# Patient Record
Sex: MALE | Race: WHITE | NOT HISPANIC OR LATINO | URBAN - METROPOLITAN AREA
[De-identification: names, ages, dates, MRNs, and addresses within clinical notes are randomized per-mention and may not be internally consistent; named-entity substitution may affect disease eponyms.]

---

## 2024-03-08 ENCOUNTER — PHARMACY VISIT (OUTPATIENT)
Dept: PHARMACY | Facility: CLINIC | Age: 34
End: 2024-03-08

## 2024-03-08 PROCEDURE — RXOTC WILLOW AMBULATORY OTC CHARGE

## 2024-09-22 ENCOUNTER — APPOINTMENT (OUTPATIENT)
Dept: RADIOLOGY | Facility: HOSPITAL | Age: 34
DRG: 465 | End: 2024-09-22
Payer: MEDICARE

## 2024-09-22 ENCOUNTER — HOSPITAL ENCOUNTER (INPATIENT)
Facility: HOSPITAL | Age: 34
DRG: 465 | End: 2024-09-22
Attending: STUDENT IN AN ORGANIZED HEALTH CARE EDUCATION/TRAINING PROGRAM | Admitting: SURGERY
Payer: MEDICARE

## 2024-09-22 VITALS
RESPIRATION RATE: 16 BRPM | SYSTOLIC BLOOD PRESSURE: 129 MMHG | HEART RATE: 79 BPM | WEIGHT: 220 LBS | DIASTOLIC BLOOD PRESSURE: 67 MMHG | TEMPERATURE: 98.4 F | HEIGHT: 72 IN | BODY MASS INDEX: 29.8 KG/M2 | OXYGEN SATURATION: 98 %

## 2024-09-22 DIAGNOSIS — S99.911A INJURY OF RIGHT ANKLE, INITIAL ENCOUNTER: ICD-10-CM
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
## 2024-09-22 LAB
ABO GROUP (TYPE) IN BLOOD: NORMAL
ALBUMIN SERPL BCP-MCNC: 4.7 G/DL (ref 3.4–5)
ALP SERPL-CCNC: 61 U/L (ref 33–120)
ALT SERPL W P-5'-P-CCNC: 39 U/L (ref 10–52)
ANION GAP SERPL CALC-SCNC: 14 MMOL/L (ref 10–20)
ANTIBODY SCREEN: NORMAL
AST SERPL W P-5'-P-CCNC: 57 U/L (ref 9–39)
BASOPHILS # BLD AUTO: 0.05 X10*3/UL (ref 0–0.1)
BASOPHILS NFR BLD AUTO: 0.6 %
BILIRUB SERPL-MCNC: 0.5 MG/DL (ref 0–1.2)
BUN SERPL-MCNC: 12 MG/DL (ref 6–23)
CALCIUM SERPL-MCNC: 9.3 MG/DL (ref 8.6–10.3)
CHLORIDE SERPL-SCNC: 104 MMOL/L (ref 98–107)
CO2 SERPL-SCNC: 25 MMOL/L (ref 21–32)
CREAT SERPL-MCNC: 1.08 MG/DL (ref 0.5–1.3)
EGFRCR SERPLBLD CKD-EPI 2021: >90 ML/MIN/1.73M*2
EOSINOPHIL # BLD AUTO: 0.09 X10*3/UL (ref 0–0.7)
EOSINOPHIL NFR BLD AUTO: 1.1 %
ERYTHROCYTE [DISTWIDTH] IN BLOOD BY AUTOMATED COUNT: 12.2 % (ref 11.5–14.5)
ERYTHROCYTE [DISTWIDTH] IN BLOOD BY AUTOMATED COUNT: 12.6 % (ref 11.5–14.5)
ETHANOL SERPL-MCNC: <10 MG/DL
GLUCOSE SERPL-MCNC: 107 MG/DL (ref 74–99)
HCT VFR BLD AUTO: 42.7 % (ref 41–52)
HCT VFR BLD AUTO: 43.5 % (ref 41–52)
HGB BLD-MCNC: 14.9 G/DL (ref 13.5–17.5)
HGB BLD-MCNC: 15.3 G/DL (ref 13.5–17.5)
HOLD SPECIMEN: NORMAL
IMM GRANULOCYTES # BLD AUTO: 0.02 X10*3/UL (ref 0–0.7)
IMM GRANULOCYTES NFR BLD AUTO: 0.2 % (ref 0–0.9)
INR PPP: 1.1 (ref 0.9–1.1)
LACTATE SERPL-SCNC: 1.1 MMOL/L (ref 0.4–2)
LACTATE SERPL-SCNC: 2.9 MMOL/L (ref 0.4–2)
LYMPHOCYTES # BLD AUTO: 2.86 X10*3/UL (ref 1.2–4.8)
LYMPHOCYTES NFR BLD AUTO: 33.5 %
MCH RBC QN AUTO: 30.4 PG (ref 26–34)
MCH RBC QN AUTO: 30.5 PG (ref 26–34)
MCHC RBC AUTO-ENTMCNC: 34.9 G/DL (ref 32–36)
MCHC RBC AUTO-ENTMCNC: 35.2 G/DL (ref 32–36)
MCV RBC AUTO: 86 FL (ref 80–100)
MCV RBC AUTO: 88 FL (ref 80–100)
MONOCYTES # BLD AUTO: 0.84 X10*3/UL (ref 0.1–1)
MONOCYTES NFR BLD AUTO: 9.8 %
NEUTROPHILS # BLD AUTO: 4.68 X10*3/UL (ref 1.2–7.7)
NEUTROPHILS NFR BLD AUTO: 54.8 %
NRBC BLD-RTO: 0 /100 WBCS (ref 0–0)
NRBC BLD-RTO: 0 /100 WBCS (ref 0–0)
PLATELET # BLD AUTO: 230 X10*3/UL (ref 150–450)
PLATELET # BLD AUTO: 264 X10*3/UL (ref 150–450)
POTASSIUM SERPL-SCNC: 3.4 MMOL/L (ref 3.5–5.3)
PROT SERPL-MCNC: 7.4 G/DL (ref 6.4–8.2)
PROTHROMBIN TIME: 12.4 SECONDS (ref 9.8–12.8)
RBC # BLD AUTO: 4.88 X10*6/UL (ref 4.5–5.9)
RBC # BLD AUTO: 5.04 X10*6/UL (ref 4.5–5.9)
RH FACTOR (ANTIGEN D): NORMAL
SODIUM SERPL-SCNC: 140 MMOL/L (ref 136–145)
WBC # BLD AUTO: 8.5 X10*3/UL (ref 4.4–11.3)
WBC # BLD AUTO: 8.9 X10*3/UL (ref 4.4–11.3)

## 2024-09-22 PROCEDURE — 99291 CRITICAL CARE FIRST HOUR: CPT | Mod: 25 | Performed by: STUDENT IN AN ORGANIZED HEALTH CARE EDUCATION/TRAINING PROGRAM

## 2024-09-22 PROCEDURE — 82077 ASSAY SPEC XCP UR&BREATH IA: CPT | Performed by: STUDENT IN AN ORGANIZED HEALTH CARE EDUCATION/TRAINING PROGRAM

## 2024-09-22 PROCEDURE — 86901 BLOOD TYPING SEROLOGIC RH(D): CPT | Performed by: STUDENT IN AN ORGANIZED HEALTH CARE EDUCATION/TRAINING PROGRAM

## 2024-09-22 PROCEDURE — 70450 CT HEAD/BRAIN W/O DYE: CPT

## 2024-09-22 PROCEDURE — 2500000004 HC RX 250 GENERAL PHARMACY W/ HCPCS (ALT 636 FOR OP/ED): Mod: JZ

## 2024-09-22 PROCEDURE — 72125 CT NECK SPINE W/O DYE: CPT

## 2024-09-22 PROCEDURE — 80053 COMPREHEN METABOLIC PANEL: CPT | Performed by: STUDENT IN AN ORGANIZED HEALTH CARE EDUCATION/TRAINING PROGRAM

## 2024-09-22 PROCEDURE — 72128 CT CHEST SPINE W/O DYE: CPT | Mod: RCN

## 2024-09-22 PROCEDURE — 73700 CT LOWER EXTREMITY W/O DYE: CPT | Mod: RT

## 2024-09-22 PROCEDURE — G0390 TRAUMA RESPONS W/HOSP CRITI: HCPCS

## 2024-09-22 PROCEDURE — 2500000004 HC RX 250 GENERAL PHARMACY W/ HCPCS (ALT 636 FOR OP/ED): Performed by: SURGERY

## 2024-09-22 PROCEDURE — 36415 COLL VENOUS BLD VENIPUNCTURE: CPT | Performed by: STUDENT IN AN ORGANIZED HEALTH CARE EDUCATION/TRAINING PROGRAM

## 2024-09-22 PROCEDURE — 72125 CT NECK SPINE W/O DYE: CPT | Performed by: RADIOLOGY

## 2024-09-22 PROCEDURE — 72131 CT LUMBAR SPINE W/O DYE: CPT | Mod: RCN

## 2024-09-22 PROCEDURE — 73650 X-RAY EXAM OF HEEL: CPT | Mod: RIGHT SIDE | Performed by: RADIOLOGY

## 2024-09-22 PROCEDURE — 71045 X-RAY EXAM CHEST 1 VIEW: CPT | Performed by: RADIOLOGY

## 2024-09-22 PROCEDURE — 73700 CT LOWER EXTREMITY W/O DYE: CPT | Mod: RIGHT SIDE | Performed by: RADIOLOGY

## 2024-09-22 PROCEDURE — 71045 X-RAY EXAM CHEST 1 VIEW: CPT

## 2024-09-22 PROCEDURE — 83605 ASSAY OF LACTIC ACID: CPT | Performed by: STUDENT IN AN ORGANIZED HEALTH CARE EDUCATION/TRAINING PROGRAM

## 2024-09-22 PROCEDURE — 72128 CT CHEST SPINE W/O DYE: CPT | Performed by: RADIOLOGY

## 2024-09-22 PROCEDURE — 74177 CT ABD & PELVIS W/CONTRAST: CPT | Performed by: RADIOLOGY

## 2024-09-22 PROCEDURE — 72131 CT LUMBAR SPINE W/O DYE: CPT | Performed by: RADIOLOGY

## 2024-09-22 PROCEDURE — 73620 X-RAY EXAM OF FOOT: CPT | Mod: RT

## 2024-09-22 PROCEDURE — 2500000001 HC RX 250 WO HCPCS SELF ADMINISTERED DRUGS (ALT 637 FOR MEDICARE OP): Performed by: ORTHOPAEDIC SURGERY

## 2024-09-22 PROCEDURE — 96376 TX/PRO/DX INJ SAME DRUG ADON: CPT

## 2024-09-22 PROCEDURE — 2500000004 HC RX 250 GENERAL PHARMACY W/ HCPCS (ALT 636 FOR OP/ED): Performed by: STUDENT IN AN ORGANIZED HEALTH CARE EDUCATION/TRAINING PROGRAM

## 2024-09-22 PROCEDURE — 99221 1ST HOSP IP/OBS SF/LOW 40: CPT | Performed by: ORTHOPAEDIC SURGERY

## 2024-09-22 PROCEDURE — 70450 CT HEAD/BRAIN W/O DYE: CPT | Performed by: RADIOLOGY

## 2024-09-22 PROCEDURE — 73590 X-RAY EXAM OF LOWER LEG: CPT | Mod: RT

## 2024-09-22 PROCEDURE — 90471 IMMUNIZATION ADMIN: CPT | Performed by: STUDENT IN AN ORGANIZED HEALTH CARE EDUCATION/TRAINING PROGRAM

## 2024-09-22 PROCEDURE — 73590 X-RAY EXAM OF LOWER LEG: CPT | Mod: RIGHT SIDE | Performed by: RADIOLOGY

## 2024-09-22 PROCEDURE — 2500000004 HC RX 250 GENERAL PHARMACY W/ HCPCS (ALT 636 FOR OP/ED): Performed by: EMERGENCY MEDICINE

## 2024-09-22 PROCEDURE — 85025 COMPLETE CBC W/AUTO DIFF WBC: CPT | Performed by: STUDENT IN AN ORGANIZED HEALTH CARE EDUCATION/TRAINING PROGRAM

## 2024-09-22 PROCEDURE — 2550000001 HC RX 255 CONTRASTS: Performed by: STUDENT IN AN ORGANIZED HEALTH CARE EDUCATION/TRAINING PROGRAM

## 2024-09-22 PROCEDURE — 99232 SBSQ HOSP IP/OBS MODERATE 35: CPT | Performed by: SURGERY

## 2024-09-22 PROCEDURE — 2500000001 HC RX 250 WO HCPCS SELF ADMINISTERED DRUGS (ALT 637 FOR MEDICARE OP): Performed by: SURGERY

## 2024-09-22 PROCEDURE — 74177 CT ABD & PELVIS W/CONTRAST: CPT

## 2024-09-22 PROCEDURE — 96375 TX/PRO/DX INJ NEW DRUG ADDON: CPT

## 2024-09-22 PROCEDURE — 85610 PROTHROMBIN TIME: CPT | Performed by: STUDENT IN AN ORGANIZED HEALTH CARE EDUCATION/TRAINING PROGRAM

## 2024-09-22 PROCEDURE — 73630 X-RAY EXAM OF FOOT: CPT | Mod: RIGHT SIDE | Performed by: RADIOLOGY

## 2024-09-22 PROCEDURE — 90715 TDAP VACCINE 7 YRS/> IM: CPT | Performed by: STUDENT IN AN ORGANIZED HEALTH CARE EDUCATION/TRAINING PROGRAM

## 2024-09-22 PROCEDURE — 85027 COMPLETE CBC AUTOMATED: CPT | Performed by: SURGERY

## 2024-09-22 PROCEDURE — 71260 CT THORAX DX C+: CPT | Performed by: RADIOLOGY

## 2024-09-22 PROCEDURE — 73650 X-RAY EXAM OF HEEL: CPT | Mod: RT

## 2024-09-22 PROCEDURE — 1100000001 HC PRIVATE ROOM DAILY

## 2024-09-22 PROCEDURE — 96365 THER/PROPH/DIAG IV INF INIT: CPT

## 2024-09-22 RX ORDER — NALOXONE HYDROCHLORIDE 1 MG/ML
0.2 INJECTION INTRAMUSCULAR; INTRAVENOUS; SUBCUTANEOUS EVERY 5 MIN PRN
Status: DISCONTINUED | OUTPATIENT
Start: 2024-09-22 | End: 2024-09-22 | Stop reason: SDUPTHER

## 2024-09-22 RX ORDER — ACETAMINOPHEN 325 MG/1
975 TABLET ORAL EVERY 6 HOURS SCHEDULED
Status: DISCONTINUED | OUTPATIENT
Start: 2024-09-22 | End: 2024-09-24 | Stop reason: HOSPADM

## 2024-09-22 RX ORDER — OXYCODONE HYDROCHLORIDE 5 MG/1
10 TABLET ORAL EVERY 4 HOURS PRN
Status: DISCONTINUED | OUTPATIENT
Start: 2024-09-22 | End: 2024-09-24 | Stop reason: HOSPADM

## 2024-09-22 RX ORDER — PROCHLORPERAZINE MALEATE 5 MG
10 TABLET ORAL EVERY 6 HOURS PRN
Status: DISCONTINUED | OUTPATIENT
Start: 2024-09-22 | End: 2024-09-24 | Stop reason: HOSPADM

## 2024-09-22 RX ORDER — NALOXONE HYDROCHLORIDE 1 MG/ML
0.2 INJECTION INTRAMUSCULAR; INTRAVENOUS; SUBCUTANEOUS EVERY 5 MIN PRN
Status: DISCONTINUED | OUTPATIENT
Start: 2024-09-22 | End: 2024-09-24 | Stop reason: HOSPADM

## 2024-09-22 RX ORDER — AMOXICILLIN 250 MG
2 CAPSULE ORAL 2 TIMES DAILY
Status: DISCONTINUED | OUTPATIENT
Start: 2024-09-22 | End: 2024-09-24 | Stop reason: HOSPADM

## 2024-09-22 RX ORDER — OXYCODONE HYDROCHLORIDE 5 MG/1
5 TABLET ORAL EVERY 6 HOURS PRN
Status: DISCONTINUED | OUTPATIENT
Start: 2024-09-22 | End: 2024-09-24 | Stop reason: HOSPADM

## 2024-09-22 RX ORDER — CEFAZOLIN SODIUM 2 G/100ML
2 INJECTION, SOLUTION INTRAVENOUS ONCE
Status: COMPLETED | OUTPATIENT
Start: 2024-09-22 | End: 2024-09-22

## 2024-09-22 RX ORDER — ONDANSETRON 4 MG/1
4 TABLET, ORALLY DISINTEGRATING ORAL EVERY 8 HOURS PRN
Status: DISCONTINUED | OUTPATIENT
Start: 2024-09-22 | End: 2024-09-24 | Stop reason: HOSPADM

## 2024-09-22 RX ORDER — CEFAZOLIN SODIUM 2 G/100ML
INJECTION, SOLUTION INTRAVENOUS
Status: COMPLETED
Start: 2024-09-22 | End: 2024-09-22

## 2024-09-22 RX ORDER — PROCHLORPERAZINE 25 MG/1
25 SUPPOSITORY RECTAL EVERY 12 HOURS PRN
Status: DISCONTINUED | OUTPATIENT
Start: 2024-09-22 | End: 2024-09-24 | Stop reason: HOSPADM

## 2024-09-22 RX ORDER — ONDANSETRON HYDROCHLORIDE 2 MG/ML
4 INJECTION, SOLUTION INTRAVENOUS EVERY 8 HOURS PRN
Status: DISCONTINUED | OUTPATIENT
Start: 2024-09-22 | End: 2024-09-24 | Stop reason: HOSPADM

## 2024-09-22 RX ORDER — SODIUM CHLORIDE, SODIUM LACTATE, POTASSIUM CHLORIDE, CALCIUM CHLORIDE 600; 310; 30; 20 MG/100ML; MG/100ML; MG/100ML; MG/100ML
75 INJECTION, SOLUTION INTRAVENOUS CONTINUOUS
Status: DISCONTINUED | OUTPATIENT
Start: 2024-09-23 | End: 2024-09-24

## 2024-09-22 RX ORDER — ENOXAPARIN SODIUM 100 MG/ML
30 INJECTION SUBCUTANEOUS EVERY 12 HOURS
Status: DISCONTINUED | OUTPATIENT
Start: 2024-09-22 | End: 2024-09-24 | Stop reason: HOSPADM

## 2024-09-22 RX ORDER — PROCHLORPERAZINE EDISYLATE 5 MG/ML
10 INJECTION INTRAMUSCULAR; INTRAVENOUS EVERY 6 HOURS PRN
Status: DISCONTINUED | OUTPATIENT
Start: 2024-09-22 | End: 2024-09-24 | Stop reason: HOSPADM

## 2024-09-22 RX ORDER — OXYCODONE AND ACETAMINOPHEN 5; 325 MG/1; MG/1
1 TABLET ORAL EVERY 6 HOURS PRN
Status: DISCONTINUED | OUTPATIENT
Start: 2024-09-22 | End: 2024-09-22

## 2024-09-22 RX ADMIN — HYDROMORPHONE HYDROCHLORIDE 0.5 MG: 1 INJECTION, SOLUTION INTRAMUSCULAR; INTRAVENOUS; SUBCUTANEOUS at 03:33

## 2024-09-22 RX ADMIN — HYDROMORPHONE HYDROCHLORIDE 0.5 MG: 1 INJECTION, SOLUTION INTRAMUSCULAR; INTRAVENOUS; SUBCUTANEOUS at 06:57

## 2024-09-22 RX ADMIN — CEFAZOLIN SODIUM 2 G: 2 INJECTION, SOLUTION INTRAVENOUS at 00:44

## 2024-09-22 RX ADMIN — CEFAZOLIN SODIUM 2 G: 2 INJECTION, SOLUTION INTRAVENOUS at 21:24

## 2024-09-22 RX ADMIN — OXYCODONE HYDROCHLORIDE 10 MG: 5 TABLET ORAL at 17:20

## 2024-09-22 RX ADMIN — OXYCODONE HYDROCHLORIDE 10 MG: 5 TABLET ORAL at 21:24

## 2024-09-22 RX ADMIN — OXYCODONE HYDROCHLORIDE AND ACETAMINOPHEN 1 TABLET: 5; 325 TABLET ORAL at 14:49

## 2024-09-22 RX ADMIN — HYDROMORPHONE HYDROCHLORIDE 0.5 MG: 1 INJECTION, SOLUTION INTRAMUSCULAR; INTRAVENOUS; SUBCUTANEOUS at 11:41

## 2024-09-22 RX ADMIN — IOHEXOL 100 ML: 350 INJECTION, SOLUTION INTRAVENOUS at 01:03

## 2024-09-22 RX ADMIN — HYDROMORPHONE HYDROCHLORIDE 0.5 MG: 1 INJECTION, SOLUTION INTRAMUSCULAR; INTRAVENOUS; SUBCUTANEOUS at 00:47

## 2024-09-22 RX ADMIN — ONDANSETRON 4 MG: 2 INJECTION INTRAMUSCULAR; INTRAVENOUS at 21:24

## 2024-09-22 RX ADMIN — TETANUS TOXOID, REDUCED DIPHTHERIA TOXOID AND ACELLULAR PERTUSSIS VACCINE, ADSORBED 0.5 ML: 5; 2.5; 8; 8; 2.5 SUSPENSION INTRAMUSCULAR at 00:47

## 2024-09-22 RX ADMIN — ACETAMINOPHEN 975 MG: 325 TABLET ORAL at 17:17

## 2024-09-22 RX ADMIN — ENOXAPARIN SODIUM 30 MG: 30 INJECTION SUBCUTANEOUS at 17:24

## 2024-09-22 SDOH — SOCIAL STABILITY: SOCIAL INSECURITY
WITHIN THE LAST YEAR, HAVE YOU BEEN KICKED, HIT, SLAPPED, OR OTHERWISE PHYSICALLY HURT BY YOUR PARTNER OR EX-PARTNER?: NO

## 2024-09-22 SDOH — ECONOMIC STABILITY: HOUSING INSECURITY: AT ANY TIME IN THE PAST 12 MONTHS, WERE YOU HOMELESS OR LIVING IN A SHELTER (INCLUDING NOW)?: NO

## 2024-09-22 SDOH — SOCIAL STABILITY: SOCIAL NETWORK: ARE YOU MARRIED, WIDOWED, DIVORCED, SEPARATED, NEVER MARRIED, OR LIVING WITH A PARTNER?: MARRIED

## 2024-09-22 SDOH — SOCIAL STABILITY: SOCIAL NETWORK: HOW OFTEN DO YOU ATTENT MEETINGS OF THE CLUB OR ORGANIZATION YOU BELONG TO?: PATIENT DECLINED

## 2024-09-22 SDOH — ECONOMIC STABILITY: INCOME INSECURITY: IN THE LAST 12 MONTHS, WAS THERE A TIME WHEN YOU WERE NOT ABLE TO PAY THE MORTGAGE OR RENT ON TIME?: NO

## 2024-09-22 SDOH — ECONOMIC STABILITY: FOOD INSECURITY: HOW HARD IS IT FOR YOU TO PAY FOR THE VERY BASICS LIKE FOOD, HOUSING, MEDICAL CARE, AND HEATING?: NOT HARD AT ALL

## 2024-09-22 SDOH — SOCIAL STABILITY: SOCIAL INSECURITY: DOES ANYONE TRY TO KEEP YOU FROM HAVING/CONTACTING OTHER FRIENDS OR DOING THINGS OUTSIDE YOUR HOME?: NO

## 2024-09-22 SDOH — ECONOMIC STABILITY: INCOME INSECURITY: IN THE PAST 12 MONTHS, HAS THE ELECTRIC, GAS, OIL, OR WATER COMPANY THREATENED TO SHUT OFF SERVICE IN YOUR HOME?: NO

## 2024-09-22 SDOH — HEALTH STABILITY: PHYSICAL HEALTH
HOW OFTEN DO YOU NEED TO HAVE SOMEONE HELP YOU WHEN YOU READ INSTRUCTIONS, PAMPHLETS, OR OTHER WRITTEN MATERIAL FROM YOUR DOCTOR OR PHARMACY?: NEVER

## 2024-09-22 SDOH — SOCIAL STABILITY: SOCIAL INSECURITY: HAVE YOU HAD THOUGHTS OF HARMING ANYONE ELSE?: YES

## 2024-09-22 SDOH — HEALTH STABILITY: MENTAL HEALTH: HOW OFTEN DO YOU HAVE 6 OR MORE DRINKS ON ONE OCCASION?: NEVER

## 2024-09-22 SDOH — ECONOMIC STABILITY: TRANSPORTATION INSECURITY
IN THE PAST 12 MONTHS, HAS THE LACK OF TRANSPORTATION KEPT YOU FROM MEDICAL APPOINTMENTS OR FROM GETTING MEDICATIONS?: NO

## 2024-09-22 SDOH — SOCIAL STABILITY: SOCIAL INSECURITY: WITHIN THE LAST YEAR, HAVE YOU BEEN AFRAID OF YOUR PARTNER OR EX-PARTNER?: NO

## 2024-09-22 SDOH — ECONOMIC STABILITY: HOUSING INSECURITY: IN THE LAST 12 MONTHS, WAS THERE A TIME WHEN YOU WERE NOT ABLE TO PAY THE MORTGAGE OR RENT ON TIME?: NO

## 2024-09-22 SDOH — SOCIAL STABILITY: SOCIAL INSECURITY: DO YOU FEEL UNSAFE GOING BACK TO THE PLACE WHERE YOU ARE LIVING?: NO

## 2024-09-22 SDOH — SOCIAL STABILITY: SOCIAL NETWORK
IN A TYPICAL WEEK, HOW MANY TIMES DO YOU TALK ON THE PHONE WITH FAMILY, FRIENDS, OR NEIGHBORS?: MORE THAN THREE TIMES A WEEK

## 2024-09-22 SDOH — SOCIAL STABILITY: SOCIAL INSECURITY: ARE YOU MARRIED, WIDOWED, DIVORCED, SEPARATED, NEVER MARRIED, OR LIVING WITH A PARTNER?: MARRIED

## 2024-09-22 SDOH — HEALTH STABILITY: PHYSICAL HEALTH: ON AVERAGE, HOW MANY MINUTES DO YOU ENGAGE IN EXERCISE AT THIS LEVEL?: 60 MIN

## 2024-09-22 SDOH — HEALTH STABILITY: MENTAL HEALTH: HOW OFTEN DO YOU HAVE A DRINK CONTAINING ALCOHOL?: NEVER

## 2024-09-22 SDOH — HEALTH STABILITY: MENTAL HEALTH: HOW OFTEN DO YOU HAVE SIX OR MORE DRINKS ON ONE OCCASION?: NEVER

## 2024-09-22 SDOH — ECONOMIC STABILITY: HOUSING INSECURITY: IN THE PAST 12 MONTHS, HOW MANY TIMES HAVE YOU MOVED WHERE YOU WERE LIVING?: 1

## 2024-09-22 SDOH — ECONOMIC STABILITY: FOOD INSECURITY: WITHIN THE PAST 12 MONTHS, THE FOOD YOU BOUGHT JUST DIDN'T LAST AND YOU DIDN'T HAVE MONEY TO GET MORE.: NEVER TRUE

## 2024-09-22 SDOH — SOCIAL STABILITY: SOCIAL NETWORK: HOW OFTEN DO YOU ATTEND MEETINGS OF THE CLUBS OR ORGANIZATIONS YOU BELONG TO?: PATIENT DECLINED

## 2024-09-22 SDOH — SOCIAL STABILITY: SOCIAL NETWORK: HOW OFTEN DO YOU GET TOGETHER WITH FRIENDS OR RELATIVES?: MORE THAN THREE TIMES A WEEK

## 2024-09-22 SDOH — SOCIAL STABILITY: SOCIAL INSECURITY: WITHIN THE LAST YEAR, HAVE YOU BEEN HUMILIATED OR EMOTIONALLY ABUSED IN OTHER WAYS BY YOUR PARTNER OR EX-PARTNER?: NO

## 2024-09-22 SDOH — HEALTH STABILITY: MENTAL HEALTH
DO YOU FEEL STRESS - TENSE, RESTLESS, NERVOUS, OR ANXIOUS, OR UNABLE TO SLEEP AT NIGHT BECAUSE YOUR MIND IS TROUBLED ALL THE TIME - THESE DAYS?: NOT AT ALL

## 2024-09-22 SDOH — SOCIAL STABILITY: SOCIAL INSECURITY
WITHIN THE LAST YEAR, HAVE TO BEEN RAPED OR FORCED TO HAVE ANY KIND OF SEXUAL ACTIVITY BY YOUR PARTNER OR EX-PARTNER?: NO

## 2024-09-22 SDOH — ECONOMIC STABILITY: INCOME INSECURITY: HOW HARD IS IT FOR YOU TO PAY FOR THE VERY BASICS LIKE FOOD, HOUSING, MEDICAL CARE, AND HEATING?: NOT HARD AT ALL

## 2024-09-22 SDOH — SOCIAL STABILITY: SOCIAL INSECURITY: DO YOU FEEL ANYONE HAS EXPLOITED OR TAKEN ADVANTAGE OF YOU FINANCIALLY OR OF YOUR PERSONAL PROPERTY?: NO

## 2024-09-22 SDOH — SOCIAL STABILITY: SOCIAL INSECURITY: HAVE YOU HAD ANY THOUGHTS OF HARMING ANYONE ELSE?: NO

## 2024-09-22 SDOH — ECONOMIC STABILITY: FOOD INSECURITY: WITHIN THE PAST 12 MONTHS, YOU WORRIED THAT YOUR FOOD WOULD RUN OUT BEFORE YOU GOT THE MONEY TO BUY MORE.: NEVER TRUE

## 2024-09-22 SDOH — HEALTH STABILITY: MENTAL HEALTH
STRESS IS WHEN SOMEONE FEELS TENSE, NERVOUS, ANXIOUS, OR CAN'T SLEEP AT NIGHT BECAUSE THEIR MIND IS TROUBLED. HOW STRESSED ARE YOU?: NOT AT ALL

## 2024-09-22 SDOH — SOCIAL STABILITY: SOCIAL NETWORK
DO YOU BELONG TO ANY CLUBS OR ORGANIZATIONS SUCH AS CHURCH GROUPS UNIONS, FRATERNAL OR ATHLETIC GROUPS, OR SCHOOL GROUPS?: PATIENT DECLINED

## 2024-09-22 SDOH — HEALTH STABILITY: PHYSICAL HEALTH: ON AVERAGE, HOW MANY DAYS PER WEEK DO YOU ENGAGE IN MODERATE TO STRENUOUS EXERCISE (LIKE A BRISK WALK)?: 5 DAYS

## 2024-09-22 SDOH — SOCIAL STABILITY: SOCIAL INSECURITY: ARE THERE ANY APPARENT SIGNS OF INJURIES/BEHAVIORS THAT COULD BE RELATED TO ABUSE/NEGLECT?: NO

## 2024-09-22 SDOH — ECONOMIC STABILITY: FOOD INSECURITY: WITHIN THE PAST 12 MONTHS, YOU WORRIED THAT YOUR FOOD WOULD RUN OUT BEFORE YOU GOT MONEY TO BUY MORE.: NEVER TRUE

## 2024-09-22 SDOH — SOCIAL STABILITY: SOCIAL INSECURITY: HAS ANYONE EVER THREATENED TO HURT YOUR FAMILY OR YOUR PETS?: NO

## 2024-09-22 SDOH — SOCIAL STABILITY: SOCIAL INSECURITY
WITHIN THE LAST YEAR, HAVE YOU BEEN RAPED OR FORCED TO HAVE ANY KIND OF SEXUAL ACTIVITY BY YOUR PARTNER OR EX-PARTNER?: NO

## 2024-09-22 SDOH — SOCIAL STABILITY: SOCIAL NETWORK: HOW OFTEN DO YOU ATTEND CHURCH OR RELIGIOUS SERVICES?: PATIENT DECLINED

## 2024-09-22 SDOH — ECONOMIC STABILITY: INCOME INSECURITY: IN THE PAST 12 MONTHS HAS THE ELECTRIC, GAS, OIL, OR WATER COMPANY THREATENED TO SHUT OFF SERVICES IN YOUR HOME?: NO

## 2024-09-22 SDOH — HEALTH STABILITY: MENTAL HEALTH: HOW MANY STANDARD DRINKS CONTAINING ALCOHOL DO YOU HAVE ON A TYPICAL DAY?: PATIENT DOES NOT DRINK

## 2024-09-22 SDOH — ECONOMIC STABILITY: TRANSPORTATION INSECURITY
IN THE PAST 12 MONTHS, HAS LACK OF TRANSPORTATION KEPT YOU FROM MEETINGS, WORK, OR FROM GETTING THINGS NEEDED FOR DAILY LIVING?: NO

## 2024-09-22 SDOH — ECONOMIC STABILITY: TRANSPORTATION INSECURITY: IN THE PAST 12 MONTHS, HAS LACK OF TRANSPORTATION KEPT YOU FROM MEDICAL APPOINTMENTS OR FROM GETTING MEDICATIONS?: NO

## 2024-09-22 SDOH — SOCIAL STABILITY: SOCIAL NETWORK
DO YOU BELONG TO ANY CLUBS OR ORGANIZATIONS SUCH AS CHURCH GROUPS, UNIONS, FRATERNAL OR ATHLETIC GROUPS, OR SCHOOL GROUPS?: PATIENT DECLINED

## 2024-09-22 SDOH — SOCIAL STABILITY: SOCIAL INSECURITY: ABUSE: ADULT

## 2024-09-22 SDOH — SOCIAL STABILITY: SOCIAL INSECURITY: ARE YOU OR HAVE YOU BEEN THREATENED OR ABUSED PHYSICALLY, EMOTIONALLY, OR SEXUALLY BY ANYONE?: NO

## 2024-09-22 SDOH — SOCIAL STABILITY: SOCIAL INSECURITY: WERE YOU ABLE TO COMPLETE ALL THE BEHAVIORAL HEALTH SCREENINGS?: YES

## 2024-09-22 SDOH — HEALTH STABILITY: MENTAL HEALTH: HOW MANY DRINKS CONTAINING ALCOHOL DO YOU HAVE ON A TYPICAL DAY WHEN YOU ARE DRINKING?: PATIENT DOES NOT DRINK

## 2024-09-22 ASSESSMENT — LIFESTYLE VARIABLES
AUDIT-C TOTAL SCORE: 0
TOTAL SCORE: 0
AUDIT-C TOTAL SCORE: 0
SKIP TO QUESTIONS 9-10: 1
AUDIT-C TOTAL SCORE: 0
HAVE PEOPLE ANNOYED YOU BY CRITICIZING YOUR DRINKING: NO
EVER FELT BAD OR GUILTY ABOUT YOUR DRINKING: NO
SUBSTANCE_ABUSE_PAST_12_MONTHS: NO
HOW OFTEN DO YOU HAVE A DRINK CONTAINING ALCOHOL: NEVER
EVER HAD A DRINK FIRST THING IN THE MORNING TO STEADY YOUR NERVES TO GET RID OF A HANGOVER: NO
HOW MANY STANDARD DRINKS CONTAINING ALCOHOL DO YOU HAVE ON A TYPICAL DAY: PATIENT DOES NOT DRINK
SKIP TO QUESTIONS 9-10: 1
HAVE YOU EVER FELT YOU SHOULD CUT DOWN ON YOUR DRINKING: NO
HOW OFTEN DO YOU HAVE 6 OR MORE DRINKS ON ONE OCCASION: NEVER
PRESCIPTION_ABUSE_PAST_12_MONTHS: NO

## 2024-09-22 ASSESSMENT — COGNITIVE AND FUNCTIONAL STATUS - GENERAL
PATIENT BASELINE BEDBOUND: NO
TOILETING: A LITTLE
WALKING IN HOSPITAL ROOM: A LOT
MOVING TO AND FROM BED TO CHAIR: A LOT
CLIMB 3 TO 5 STEPS WITH RAILING: TOTAL
DRESSING REGULAR LOWER BODY CLOTHING: A LOT
DAILY ACTIVITIY SCORE: 20
MOBILITY SCORE: 15
HELP NEEDED FOR BATHING: A LITTLE
STANDING UP FROM CHAIR USING ARMS: A LOT

## 2024-09-22 ASSESSMENT — ACTIVITIES OF DAILY LIVING (ADL)
PATIENT'S MEMORY ADEQUATE TO SAFELY COMPLETE DAILY ACTIVITIES?: YES
HEARING - LEFT EAR: FUNCTIONAL
HEARING - RIGHT EAR: FUNCTIONAL
BATHING: INDEPENDENT
TOILETING: INDEPENDENT
WALKS IN HOME: INDEPENDENT
GROOMING: INDEPENDENT
DRESSING YOURSELF: INDEPENDENT
LACK_OF_TRANSPORTATION: NO
JUDGMENT_ADEQUATE_SAFELY_COMPLETE_DAILY_ACTIVITIES: YES
FEEDING YOURSELF: INDEPENDENT
ADEQUATE_TO_COMPLETE_ADL: YES
LACK_OF_TRANSPORTATION: NO

## 2024-09-22 ASSESSMENT — PAIN SCALES - GENERAL
PAINLEVEL_OUTOF10: 9
PAINLEVEL_OUTOF10: 10 - WORST POSSIBLE PAIN
PAINLEVEL_OUTOF10: 7
PAINLEVEL_OUTOF10: 5 - MODERATE PAIN
PAINLEVEL_OUTOF10: 5 - MODERATE PAIN
PAINLEVEL_OUTOF10: 3

## 2024-09-22 ASSESSMENT — PAIN - FUNCTIONAL ASSESSMENT
PAIN_FUNCTIONAL_ASSESSMENT: 0-10

## 2024-09-22 ASSESSMENT — PATIENT HEALTH QUESTIONNAIRE - PHQ9
2. FEELING DOWN, DEPRESSED OR HOPELESS: NOT AT ALL
SUM OF ALL RESPONSES TO PHQ9 QUESTIONS 1 & 2: 0
1. LITTLE INTEREST OR PLEASURE IN DOING THINGS: NOT AT ALL
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
## 2024-09-22 NOTE — H&P
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 after midnight  -Will need PT OT after surgery  -OOB as tolerated, ISS, DVT proph, SCDs on left    Andrey Panda MD  09/22/24  12:47 PM      "
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
## 2024-09-22 NOTE — CARE PLAN
The patient's goals for the shift include Rest    The clinical goals for the shift include Pain control    Over the shift, the patient did not make progress toward the following goals. Barriers to progression include family visiting at bedside and right foot fracture. Recommendations to address these barriers include use of prn pain medication.

## 2024-09-23 ENCOUNTER — ANESTHESIA EVENT (OUTPATIENT)
Dept: OPERATING ROOM | Facility: HOSPITAL | Age: 34
DRG: 465 | End: 2024-09-23
Payer: MEDICARE

## 2024-09-23 ENCOUNTER — ANESTHESIA (OUTPATIENT)
Dept: OPERATING ROOM | Facility: HOSPITAL | Age: 34
DRG: 465 | End: 2024-09-23
Payer: MEDICARE

## 2024-09-23 PROBLEM — S99.911A INJURY OF RIGHT ANKLE: Status: ACTIVE | Noted: 2024-09-22

## 2024-09-23 LAB
ERYTHROCYTE [DISTWIDTH] IN BLOOD BY AUTOMATED COUNT: 12.4 % (ref 11.5–14.5)
HCT VFR BLD AUTO: 40.5 % (ref 41–52)
HGB BLD-MCNC: 13.8 G/DL (ref 13.5–17.5)
MCH RBC QN AUTO: 30.2 PG (ref 26–34)
MCHC RBC AUTO-ENTMCNC: 34.1 G/DL (ref 32–36)
MCV RBC AUTO: 89 FL (ref 80–100)
NRBC BLD-RTO: 0 /100 WBCS (ref 0–0)
PLATELET # BLD AUTO: 210 X10*3/UL (ref 150–450)
RBC # BLD AUTO: 4.57 X10*6/UL (ref 4.5–5.9)
WBC # BLD AUTO: 7.6 X10*3/UL (ref 4.4–11.3)

## 2024-09-23 PROCEDURE — 99232 SBSQ HOSP IP/OBS MODERATE 35: CPT | Performed by: REGISTERED NURSE

## 2024-09-23 PROCEDURE — 2500000004 HC RX 250 GENERAL PHARMACY W/ HCPCS (ALT 636 FOR OP/ED): Performed by: ORTHOPAEDIC SURGERY

## 2024-09-23 PROCEDURE — 36415 COLL VENOUS BLD VENIPUNCTURE: CPT | Performed by: SURGERY

## 2024-09-23 PROCEDURE — 0JBQ0ZZ EXCISION OF RIGHT FOOT SUBCUTANEOUS TISSUE AND FASCIA, OPEN APPROACH: ICD-10-PCS | Performed by: ORTHOPAEDIC SURGERY

## 2024-09-23 PROCEDURE — 2500000004 HC RX 250 GENERAL PHARMACY W/ HCPCS (ALT 636 FOR OP/ED): Mod: JZ | Performed by: SURGERY

## 2024-09-23 PROCEDURE — 85027 COMPLETE CBC AUTOMATED: CPT | Performed by: SURGERY

## 2024-09-23 PROCEDURE — 28400 CLTX CALCANEAL FX W/O MNPJ: CPT | Performed by: ORTHOPAEDIC SURGERY

## 2024-09-23 PROCEDURE — 2500000004 HC RX 250 GENERAL PHARMACY W/ HCPCS (ALT 636 FOR OP/ED): Performed by: SURGERY

## 2024-09-23 PROCEDURE — 3600000007 HC OR TIME - EACH INCREMENTAL 1 MINUTE - PROCEDURE LEVEL TWO: Performed by: ORTHOPAEDIC SURGERY

## 2024-09-23 PROCEDURE — 27620 EXPLORE/TREAT ANKLE JOINT: CPT | Performed by: ORTHOPAEDIC SURGERY

## 2024-09-23 PROCEDURE — 2500000001 HC RX 250 WO HCPCS SELF ADMINISTERED DRUGS (ALT 637 FOR MEDICARE OP): Performed by: REGISTERED NURSE

## 2024-09-23 PROCEDURE — 3700000002 HC GENERAL ANESTHESIA TIME - EACH INCREMENTAL 1 MINUTE: Performed by: ORTHOPAEDIC SURGERY

## 2024-09-23 PROCEDURE — 2500000004 HC RX 250 GENERAL PHARMACY W/ HCPCS (ALT 636 FOR OP/ED): Mod: JZ | Performed by: REGISTERED NURSE

## 2024-09-23 PROCEDURE — 2500000004 HC RX 250 GENERAL PHARMACY W/ HCPCS (ALT 636 FOR OP/ED): Performed by: REGISTERED NURSE

## 2024-09-23 PROCEDURE — 3700000001 HC GENERAL ANESTHESIA TIME - INITIAL BASE CHARGE: Performed by: ORTHOPAEDIC SURGERY

## 2024-09-23 PROCEDURE — 7100000002 HC RECOVERY ROOM TIME - EACH INCREMENTAL 1 MINUTE: Performed by: ORTHOPAEDIC SURGERY

## 2024-09-23 PROCEDURE — 2500000005 HC RX 250 GENERAL PHARMACY W/O HCPCS: Performed by: ORTHOPAEDIC SURGERY

## 2024-09-23 PROCEDURE — 2500000004 HC RX 250 GENERAL PHARMACY W/ HCPCS (ALT 636 FOR OP/ED): Performed by: NURSE ANESTHETIST, CERTIFIED REGISTERED

## 2024-09-23 PROCEDURE — 7100000001 HC RECOVERY ROOM TIME - INITIAL BASE CHARGE: Performed by: ORTHOPAEDIC SURGERY

## 2024-09-23 PROCEDURE — 3600000002 HC OR TIME - INITIAL BASE CHARGE - PROCEDURE LEVEL TWO: Performed by: ORTHOPAEDIC SURGERY

## 2024-09-23 PROCEDURE — 1100000001 HC PRIVATE ROOM DAILY

## 2024-09-23 PROCEDURE — 2720000007 HC OR 272 NO HCPCS: Performed by: ORTHOPAEDIC SURGERY

## 2024-09-23 PROCEDURE — 27620 EXPLORE/TREAT ANKLE JOINT: CPT | Performed by: PHYSICIAN ASSISTANT

## 2024-09-23 RX ORDER — MEPERIDINE HYDROCHLORIDE 25 MG/ML
12.5 INJECTION INTRAMUSCULAR; INTRAVENOUS; SUBCUTANEOUS EVERY 10 MIN PRN
Status: DISCONTINUED | OUTPATIENT
Start: 2024-09-23 | End: 2024-09-23 | Stop reason: HOSPADM

## 2024-09-23 RX ORDER — FENTANYL CITRATE 50 UG/ML
INJECTION, SOLUTION INTRAMUSCULAR; INTRAVENOUS AS NEEDED
Status: DISCONTINUED | OUTPATIENT
Start: 2024-09-23 | End: 2024-09-23

## 2024-09-23 RX ORDER — KETOROLAC TROMETHAMINE 30 MG/ML
INJECTION, SOLUTION INTRAMUSCULAR; INTRAVENOUS AS NEEDED
Status: DISCONTINUED | OUTPATIENT
Start: 2024-09-23 | End: 2024-09-23

## 2024-09-23 RX ORDER — LABETALOL HYDROCHLORIDE 5 MG/ML
5 INJECTION, SOLUTION INTRAVENOUS ONCE AS NEEDED
Status: DISCONTINUED | OUTPATIENT
Start: 2024-09-23 | End: 2024-09-23 | Stop reason: HOSPADM

## 2024-09-23 RX ORDER — PROPOFOL 10 MG/ML
INJECTION, EMULSION INTRAVENOUS AS NEEDED
Status: DISCONTINUED | OUTPATIENT
Start: 2024-09-23 | End: 2024-09-23

## 2024-09-23 RX ORDER — OXYCODONE HYDROCHLORIDE 5 MG/1
5 TABLET ORAL EVERY 4 HOURS PRN
Status: DISCONTINUED | OUTPATIENT
Start: 2024-09-23 | End: 2024-09-24 | Stop reason: HOSPADM

## 2024-09-23 RX ORDER — DIPHENHYDRAMINE HYDROCHLORIDE 50 MG/ML
12.5 INJECTION INTRAMUSCULAR; INTRAVENOUS ONCE AS NEEDED
Status: DISCONTINUED | OUTPATIENT
Start: 2024-09-23 | End: 2024-09-23 | Stop reason: HOSPADM

## 2024-09-23 RX ORDER — DIPHENHYDRAMINE HYDROCHLORIDE 50 MG/ML
INJECTION INTRAMUSCULAR; INTRAVENOUS AS NEEDED
Status: DISCONTINUED | OUTPATIENT
Start: 2024-09-23 | End: 2024-09-23

## 2024-09-23 RX ORDER — DROPERIDOL 2.5 MG/ML
0.62 INJECTION, SOLUTION INTRAMUSCULAR; INTRAVENOUS ONCE AS NEEDED
Status: DISCONTINUED | OUTPATIENT
Start: 2024-09-23 | End: 2024-09-23 | Stop reason: HOSPADM

## 2024-09-23 RX ORDER — SODIUM CHLORIDE 0.9 G/100ML
IRRIGANT IRRIGATION AS NEEDED
Status: DISCONTINUED | OUTPATIENT
Start: 2024-09-23 | End: 2024-09-23 | Stop reason: HOSPADM

## 2024-09-23 RX ORDER — SODIUM CHLORIDE, SODIUM LACTATE, POTASSIUM CHLORIDE, CALCIUM CHLORIDE 600; 310; 30; 20 MG/100ML; MG/100ML; MG/100ML; MG/100ML
100 INJECTION, SOLUTION INTRAVENOUS CONTINUOUS
Status: DISCONTINUED | OUTPATIENT
Start: 2024-09-23 | End: 2024-09-24

## 2024-09-23 RX ORDER — FENTANYL CITRATE 50 UG/ML
50 INJECTION, SOLUTION INTRAMUSCULAR; INTRAVENOUS EVERY 5 MIN PRN
Status: DISCONTINUED | OUTPATIENT
Start: 2024-09-23 | End: 2024-09-23 | Stop reason: HOSPADM

## 2024-09-23 RX ORDER — BUPIVACAINE HYDROCHLORIDE 5 MG/ML
INJECTION, SOLUTION PERINEURAL AS NEEDED
Status: DISCONTINUED | OUTPATIENT
Start: 2024-09-23 | End: 2024-09-23 | Stop reason: HOSPADM

## 2024-09-23 RX ORDER — LIDOCAINE HYDROCHLORIDE 10 MG/ML
0.1 INJECTION, SOLUTION EPIDURAL; INFILTRATION; INTRACAUDAL; PERINEURAL ONCE
Status: DISCONTINUED | OUTPATIENT
Start: 2024-09-23 | End: 2024-09-23 | Stop reason: HOSPADM

## 2024-09-23 RX ORDER — MIDAZOLAM HYDROCHLORIDE 1 MG/ML
INJECTION, SOLUTION INTRAMUSCULAR; INTRAVENOUS AS NEEDED
Status: DISCONTINUED | OUTPATIENT
Start: 2024-09-23 | End: 2024-09-23

## 2024-09-23 RX ADMIN — ACETAMINOPHEN 975 MG: 325 TABLET ORAL at 17:25

## 2024-09-23 RX ADMIN — SODIUM CHLORIDE, SODIUM LACTATE, POTASSIUM CHLORIDE, AND CALCIUM CHLORIDE 75 ML/HR: 600; 310; 30; 20 INJECTION, SOLUTION INTRAVENOUS at 00:21

## 2024-09-23 RX ADMIN — SODIUM CHLORIDE, POTASSIUM CHLORIDE, SODIUM LACTATE AND CALCIUM CHLORIDE 100 ML/HR: 600; 310; 30; 20 INJECTION, SOLUTION INTRAVENOUS at 17:33

## 2024-09-23 RX ADMIN — CEFAZOLIN SODIUM 2 G: 2 INJECTION, SOLUTION INTRAVENOUS at 04:32

## 2024-09-23 RX ADMIN — CEFAZOLIN SODIUM 2 G: 2 INJECTION, SOLUTION INTRAVENOUS at 20:11

## 2024-09-23 RX ADMIN — DOCUSATE SODIUM 50MG AND SENNOSIDES 8.6MG 2 TABLET: 8.6; 5 TABLET, FILM COATED ORAL at 20:11

## 2024-09-23 RX ADMIN — ACETAMINOPHEN 975 MG: 325 TABLET ORAL at 00:21

## 2024-09-23 RX ADMIN — ACETAMINOPHEN 975 MG: 325 TABLET ORAL at 23:32

## 2024-09-23 RX ADMIN — ACETAMINOPHEN 975 MG: 325 TABLET ORAL at 11:27

## 2024-09-23 RX ADMIN — CEFAZOLIN SODIUM 2 G: 2 INJECTION, SOLUTION INTRAVENOUS at 12:12

## 2024-09-23 ASSESSMENT — PAIN SCALES - GENERAL
PAINLEVEL_OUTOF10: 0 - NO PAIN
PAIN_LEVEL: 0
PAINLEVEL_OUTOF10: 5 - MODERATE PAIN
PAINLEVEL_OUTOF10: 0 - NO PAIN
PAINLEVEL_OUTOF10: 0 - NO PAIN
PAINLEVEL_OUTOF10: 8
PAINLEVEL_OUTOF10: 0 - NO PAIN

## 2024-09-23 ASSESSMENT — PAIN - FUNCTIONAL ASSESSMENT
PAIN_FUNCTIONAL_ASSESSMENT: 0-10

## 2024-09-23 ASSESSMENT — PAIN DESCRIPTION - ORIENTATION
ORIENTATION: RIGHT
ORIENTATION: RIGHT

## 2024-09-23 ASSESSMENT — ACTIVITIES OF DAILY LIVING (ADL): LACK_OF_TRANSPORTATION: NO

## 2024-09-23 ASSESSMENT — PAIN DESCRIPTION - LOCATION
LOCATION: ANKLE
LOCATION: ANKLE

## 2024-09-23 NOTE — PROGRESS NOTES
Occupational Therapy                 Therapy Communication Note    Patient Name: Orlin Mckay  MRN: [id number]  Department: [facility]  Room: [facility]610-  Today's Date: 9/23/2024     Discipline: Occupational Therapy    Missed Visit Reason: Missed Visit Reason: Other (Comment) (Patient scheduled for I+ D right ankle. Will need orders post op with weight bearing specifications)

## 2024-09-23 NOTE — ANESTHESIA POSTPROCEDURE EVALUATION
Patient: Orlin Mckay    Procedure Summary       Date: 09/23/24 Room / Location: [facility] OR 11 / [facility]    Anesthesia Start: 1444 Anesthesia Stop: 1541    Procedure: Debridement Ankle (Right: Ankle) Diagnosis:       Injury of right ankle, initial encounter      (Injury of right ankle, initial encounter [S99.911A])    Surgeons: Anastacio Pagan MD Responsible Provider: Navarro Eugene MD    Anesthesia Type: general ASA Status: 2            Anesthesia Type: general    Vitals Value Taken Time   /55 09/23/24 1541   Temp 36.5 09/23/24 1541   Pulse 78 09/23/24 1541   Resp 18 09/23/24 1541   SpO2 100 09/23/24 1541       Anesthesia Post Evaluation    Patient location during evaluation: bedside  Patient participation: complete - patient cannot participate  Level of consciousness: lethargic  Pain score: 0  Pain management: adequate  Airway patency: patent  Cardiovascular status: acceptable and stable  Respiratory status: acceptable, face mask and oral airway  Hydration status: stable  Postoperative Nausea and Vomiting: none      No notable events documented.
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
## 2024-09-23 NOTE — ANESTHESIA PROCEDURE NOTES
Airway  Date/Time: 9/23/2024 2:51 PM  Urgency: elective    Airway not difficult    Staffing  Performed: CRNA   Authorized by: Boy Burris DO    Performed by: MAHIN Yancey-GARRISON  Patient location during procedure: OR    Indications and Patient Condition  Indications for airway management: anesthesia  Spontaneous Ventilation: absent  Sedation level: deep  Preoxygenated: yes  Patient position: sniffing  MILS maintained throughout  Mask difficulty assessment: 0 - not attempted  Planned trial extubation    Final Airway Details  Final airway type: supraglottic airway      Successful airway: unique  Size 5  Cuff Pressure (cm H2O): 20  Airway Seal Pressure (cm H2O): 20     Number of attempts at approach: 1  Ventilation between attempts: BVM  Number of other approaches attempted: 0

## 2024-09-23 NOTE — DISCHARGE INSTRUCTIONS
25% partial weight bearing to right lower extremity   Do not remove splint or dressing after surgery. The surgeon will do so at your follow up appointment.   Please go to one week follow up appointment with Dr. Anastacio Pagan 10/1/224 at 2:15 pm
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
## 2024-09-23 NOTE — PROGRESS NOTES
Physical Therapy                 Therapy Communication Note    Patient Name: Orlin Mckay  MRN: [id number]  Department: [facility]  Room: 610/610-A  Today's Date: 9/23/2024 422    Discipline: Physical Therapy      Comment: Note patient scheduled for I+ D right ankle. Will need orders post op with weight bearing specifications

## 2024-09-23 NOTE — ANESTHESIA PREPROCEDURE EVALUATION
Patient: Orlin Mckay    Procedure Information       Date/Time: 09/23/24 1330    Procedure: Debridement Ankle (Right: Ankle)    Location: [facility] / [facility]    Surgeons: Anastacio Pagan MD            Relevant Problems   No relevant active problems       Clinical information reviewed:   Tobacco  Allergies  Meds   Med Hx  Surg Hx   Fam Hx  Soc Hx        NPO Detail:  No data recorded     Physical Exam    Airway  Mallampati: II     Cardiovascular   Rhythm: regular  Rate: normal     Dental    Pulmonary - normal exam     Abdominal - normal exam             Anesthesia Plan    History of general anesthesia?: yes  History of complications of general anesthesia?: no    ASA 2     general     intravenous induction   Postoperative administration of opioids is intended.  Anesthetic plan and risks discussed with patient.
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
## 2024-09-24 ENCOUNTER — PHARMACY VISIT (OUTPATIENT)
Dept: PHARMACY | Facility: CLINIC | Age: 34
End: 2024-09-24
Payer: COMMERCIAL

## 2024-09-24 VITALS
OXYGEN SATURATION: 97 % | TEMPERATURE: 97.9 F | WEIGHT: 220 LBS | RESPIRATION RATE: 16 BRPM | BODY MASS INDEX: 29.8 KG/M2 | SYSTOLIC BLOOD PRESSURE: 116 MMHG | DIASTOLIC BLOOD PRESSURE: 64 MMHG | HEART RATE: 64 BPM | HEIGHT: 72 IN

## 2024-09-24 LAB
ANION GAP SERPL CALC-SCNC: 9 MMOL/L (ref 10–20)
BUN SERPL-MCNC: 9 MG/DL (ref 6–23)
CALCIUM SERPL-MCNC: 8.7 MG/DL (ref 8.6–10.3)
CHLORIDE SERPL-SCNC: 104 MMOL/L (ref 98–107)
CO2 SERPL-SCNC: 30 MMOL/L (ref 21–32)
CREAT SERPL-MCNC: 0.83 MG/DL (ref 0.5–1.3)
EGFRCR SERPLBLD CKD-EPI 2021: >90 ML/MIN/1.73M*2
ERYTHROCYTE [DISTWIDTH] IN BLOOD BY AUTOMATED COUNT: 11.9 % (ref 11.5–14.5)
GLUCOSE SERPL-MCNC: 110 MG/DL (ref 74–99)
HCT VFR BLD AUTO: 38.8 % (ref 41–52)
HGB BLD-MCNC: 13.5 G/DL (ref 13.5–17.5)
HOLD SPECIMEN: NORMAL
MCH RBC QN AUTO: 30.6 PG (ref 26–34)
MCHC RBC AUTO-ENTMCNC: 34.8 G/DL (ref 32–36)
MCV RBC AUTO: 88 FL (ref 80–100)
NRBC BLD-RTO: 0 /100 WBCS (ref 0–0)
PLATELET # BLD AUTO: 217 X10*3/UL (ref 150–450)
POTASSIUM SERPL-SCNC: 4.3 MMOL/L (ref 3.5–5.3)
RBC # BLD AUTO: 4.41 X10*6/UL (ref 4.5–5.9)
SODIUM SERPL-SCNC: 139 MMOL/L (ref 136–145)
WBC # BLD AUTO: 11.8 X10*3/UL (ref 4.4–11.3)

## 2024-09-24 PROCEDURE — 97165 OT EVAL LOW COMPLEX 30 MIN: CPT | Mod: GO

## 2024-09-24 PROCEDURE — 85027 COMPLETE CBC AUTOMATED: CPT

## 2024-09-24 PROCEDURE — 97161 PT EVAL LOW COMPLEX 20 MIN: CPT | Mod: GP | Performed by: PHYSICAL THERAPIST

## 2024-09-24 PROCEDURE — 2500000004 HC RX 250 GENERAL PHARMACY W/ HCPCS (ALT 636 FOR OP/ED): Mod: JZ | Performed by: REGISTERED NURSE

## 2024-09-24 PROCEDURE — 36415 COLL VENOUS BLD VENIPUNCTURE: CPT

## 2024-09-24 PROCEDURE — 99239 HOSP IP/OBS DSCHRG MGMT >30: CPT

## 2024-09-24 PROCEDURE — 80048 BASIC METABOLIC PNL TOTAL CA: CPT

## 2024-09-24 PROCEDURE — 97116 GAIT TRAINING THERAPY: CPT | Mod: GP,CQ

## 2024-09-24 PROCEDURE — 99238 HOSP IP/OBS DSCHRG MGMT 30/<: CPT

## 2024-09-24 PROCEDURE — RXMED WILLOW AMBULATORY MEDICATION CHARGE

## 2024-09-24 RX ORDER — DOCUSATE SODIUM 100 MG/1
100 CAPSULE, LIQUID FILLED ORAL 2 TIMES DAILY
Qty: 20 CAPSULE | Refills: 0 | Status: SHIPPED | OUTPATIENT
Start: 2024-09-24 | End: 2024-10-04

## 2024-09-24 RX ORDER — CYCLOBENZAPRINE HCL 10 MG
10 TABLET ORAL 3 TIMES DAILY PRN
Qty: 21 TABLET | Refills: 0 | Status: SHIPPED | OUTPATIENT
Start: 2024-09-24 | End: 2024-10-01

## 2024-09-24 RX ORDER — OXYCODONE HYDROCHLORIDE 5 MG/1
5 TABLET ORAL EVERY 6 HOURS PRN
Qty: 28 TABLET | Refills: 0 | Status: SHIPPED | OUTPATIENT
Start: 2024-09-24 | End: 2024-10-01

## 2024-09-24 RX ORDER — ACETAMINOPHEN 325 MG/1
975 TABLET ORAL EVERY 6 HOURS SCHEDULED
Start: 2024-09-24 | End: 2024-10-01

## 2024-09-24 RX ADMIN — CEFAZOLIN SODIUM 2 G: 2 INJECTION, SOLUTION INTRAVENOUS at 04:11

## 2024-09-24 RX ADMIN — CEFAZOLIN SODIUM 2 G: 2 INJECTION, SOLUTION INTRAVENOUS at 12:02

## 2024-09-24 RX ADMIN — ACETAMINOPHEN 975 MG: 325 TABLET ORAL at 06:44

## 2024-09-24 RX ADMIN — ACETAMINOPHEN 975 MG: 325 TABLET ORAL at 12:02

## 2024-09-24 ASSESSMENT — COGNITIVE AND FUNCTIONAL STATUS - GENERAL
CLIMB 3 TO 5 STEPS WITH RAILING: A LITTLE
MOVING TO AND FROM BED TO CHAIR: A LITTLE
DRESSING REGULAR LOWER BODY CLOTHING: A LITTLE
HELP NEEDED FOR BATHING: A LITTLE
TOILETING: A LITTLE
WALKING IN HOSPITAL ROOM: A LITTLE
WALKING IN HOSPITAL ROOM: A LITTLE
MOVING TO AND FROM BED TO CHAIR: A LITTLE
TOILETING: A LITTLE
TURNING FROM BACK TO SIDE WHILE IN FLAT BAD: A LITTLE
CLIMB 3 TO 5 STEPS WITH RAILING: A LITTLE
DRESSING REGULAR LOWER BODY CLOTHING: A LITTLE
WALKING IN HOSPITAL ROOM: A LITTLE
MOBILITY SCORE: 20
STANDING UP FROM CHAIR USING ARMS: A LITTLE
DAILY ACTIVITIY SCORE: 22
CLIMB 3 TO 5 STEPS WITH RAILING: A LOT
STANDING UP FROM CHAIR USING ARMS: A LITTLE
DAILY ACTIVITIY SCORE: 21
MOBILITY SCORE: 20
MOVING TO AND FROM BED TO CHAIR: A LITTLE
MOBILITY SCORE: 19

## 2024-09-24 ASSESSMENT — PAIN SCALES - GENERAL
PAINLEVEL_OUTOF10: 4
PAINLEVEL_OUTOF10: 0 - NO PAIN
PAINLEVEL_OUTOF10: 8
PAINLEVEL_OUTOF10: 8

## 2024-09-24 ASSESSMENT — PAIN - FUNCTIONAL ASSESSMENT
PAIN_FUNCTIONAL_ASSESSMENT: 0-10
PAIN_FUNCTIONAL_ASSESSMENT: 0-10

## 2024-09-24 ASSESSMENT — ACTIVITIES OF DAILY LIVING (ADL)
LACK_OF_TRANSPORTATION: NO
BATHING_ASSISTANCE: MINIMAL

## 2024-09-24 NOTE — PROGRESS NOTES
Orthopedic Surgery Progress Note    Subjective:     Post-Operative Day # 1   Status Post right ankle debridement    Systemic or Specific Complaints: No Complaints    Objective:     Visit Vitals  /64   Pulse 64   Temp 36.6 °C (97.9 °F)   Resp 16       General: alert and oriented, in no acute distress, appears stated age, and cooperative   Wound: no erythema, no edema, no drainage, and dressing remains clean, dry, and intact.    Motion: Painful range of Motion   DVT Exam: No evidence of DVT seen on physical exam.  Negative Sheri's sign.  No significant calf/ankle edema. To LLE. Unable to assess RLE d/t dressing       Right thigh soft to palpation. He is able to move toes. Sensation intact. Posterior splint dressing maintained without drainage.       Data Review  Recent Results (from the past 24 hour(s))   CBC    Collection Time: 09/24/24  7:18 AM   Result Value Ref Range    WBC 11.8 (H) 4.4 - 11.3 x10*3/uL    nRBC 0.0 0.0 - 0.0 /100 WBCs    RBC 4.41 (L) 4.50 - 5.90 x10*6/uL    Hemoglobin 13.5 13.5 - 17.5 g/dL    Hematocrit 38.8 (L) 41.0 - 52.0 %    MCV 88 80 - 100 fL    MCH 30.6 26.0 - 34.0 pg    MCHC 34.8 32.0 - 36.0 g/dL    RDW 11.9 11.5 - 14.5 %    Platelets 217 150 - 450 x10*3/uL   Basic Metabolic Panel    Collection Time: 09/24/24  7:18 AM   Result Value Ref Range    Glucose 110 (H) 74 - 99 mg/dL    Sodium 139 136 - 145 mmol/L    Potassium 4.3 3.5 - 5.3 mmol/L    Chloride 104 98 - 107 mmol/L    Bicarbonate 30 21 - 32 mmol/L    Anion Gap 9 (L) 10 - 20 mmol/L    Urea Nitrogen 9 6 - 23 mg/dL    Creatinine 0.83 0.50 - 1.30 mg/dL    eGFR >90 >60 mL/min/1.73m*2    Calcium 8.7 8.6 - 10.3 mg/dL   SST TOP    Collection Time: 09/24/24  7:18 AM   Result Value Ref Range    Extra Tube Hold for add-ons.          Assessment:     Status Post right ankle debridement. Doing well postoperatively. No acute events overnight.     Acute postoperative pain: Reports mild to moderate pain to operative extremity. Exacerbated by  movement, relieved by rest ice and pain medication. Continue current pain management.     Plan:      1.  Discharge today, Return to Clinic: in 1 week    2.  Continue Deep venous thrombosis prophylaxis: per primary team  3.  Continue physical therapy: 25% Weight-bearing to right lower extremity. Maintain dressing until follow up  4.  Continue Pain Control: scripts sent  5.  Discharge planning: patient plans to return to home upon discharge. SW and TCC following for discharge planning. Follow up appointment scheduled.         KATHERYN Bassett   9/24/2024 9:28 AM
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
## 2024-09-24 NOTE — PROGRESS NOTES
09/24/24 1303   Discharge Planning   Living Arrangements Spouse/significant other;Children   Support Systems Spouse/significant other   Assistance Needed none, PTA ind ADLS and IADLS no AD, drives, works, denies falls   Type of Residence Private residence  (1 level home)   Number of Stairs to Enter Residence 3   Number of Stairs Within Residence 0   Do you have animals or pets at home? Yes   Type of Animals or Pets 1 Dog   Home or Post Acute Services None   Expected Discharge Disposition Home   Does the patient need discharge transport arranged? No   Financial Resource Strain   How hard is it for you to pay for the very basics like food, housing, medical care, and heating? Not hard   Housing Stability   In the last 12 months, was there a time when you were not able to pay the mortgage or rent on time? N   In the past 12 months, how many times have you moved where you were living? 0   At any time in the past 12 months, were you homeless or living in a shelter (including now)? N   Transportation Needs   In the past 12 months, has lack of transportation kept you from medical appointments or from getting medications? no   In the past 12 months, has lack of transportation kept you from meetings, work, or from getting things needed for daily living? No     Pt is s/p POD #1 debridement right ankle on 9/23/24, no additional wound care, pt received last dose IV antibiotic, Pt is 25% Weight-bearing to right lower extremity. Maintain dressing until follow up. AMPAC scores are PT (19) OT (21) no further therapy needs. Pt was issued crutches by therapy prior to DC. Pt states wife home and able to assist as needed, DC preference is home, declines home going needs. No further DC needs identified at this time.

## 2024-09-24 NOTE — CARE PLAN
The patient's goals for the shift include Rest    The clinical goals for the shift include pain management    Over the shift, the patient did  make progress toward the following goals. Barriers to progression include none. Recommendations to address these barriers include none.
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
## 2024-09-25 ENCOUNTER — PATIENT OUTREACH (OUTPATIENT)
Dept: CARE COORDINATION | Facility: CLINIC | Age: 34
End: 2024-09-25
Payer: COMMERCIAL

## 2024-09-25 NOTE — PROGRESS NOTES
[facility] member [facility] MA outreach.    Spoke with member. I introduced myself and purpose of call.  Confirmed :   No new or worsening symptoms.   Member has Rx given at discharge:  Endorses adequate fluid intake. Discussed importance of hydration.  Has access to AVS  No assistance needed at this time.  Lina NORRIS, MOI  [facility] [place] [facility]  Office Phone: [phone]     Engagement  Call Start Time: 0446 (2024  4:54 PM)    Medications  Medications reviewed with patient/caregiver?: Yes (2024  4:54 PM)  Is the patient having any side effects they believe may be caused by any medication additions or changes?: No (2024  4:54 PM)  Does the patient have all medications ordered at discharge?: Yes (2024  4:54 PM)  Care Management Interventions: No intervention needed (2024  4:54 PM)  Is the patient taking all medications as directed (includes completed medication regime)?: Yes (2024  4:54 PM)    Appointments  Does the patient have a primary care provider?: Yes (2024  4:54 PM)  Care Management Interventions: Advised patient to make appointment (2024  4:54 PM)  Has the patient kept scheduled appointments due by today?: Not applicable (2024  4:54 PM)    Patient Teaching  Does the patient have access to their discharge instructions?: Yes (2024  4:54 PM)  What is the patient's perception of their health status since discharge?: Improving (2024  4:54 PM)

## 2024-10-01 ENCOUNTER — APPOINTMENT (OUTPATIENT)
Dept: ORTHOPEDIC SURGERY | Facility: CLINIC | Age: 34
End: 2024-10-01
Payer: COMMERCIAL

## 2024-10-01 DIAGNOSIS — Z48.89 ENCOUNTER FOR POSTOPERATIVE WOUND CHECK: ICD-10-CM

## 2024-10-01 DIAGNOSIS — Z98.890 POST-OPERATIVE STATE: Primary | ICD-10-CM

## 2024-10-01 PROCEDURE — 99024 POSTOP FOLLOW-UP VISIT: CPT | Performed by: ORTHOPAEDIC SURGERY

## 2024-10-01 PROCEDURE — L4361 PNEUMA/VAC WALK BOOT PRE OTS: HCPCS | Performed by: ORTHOPAEDIC SURGERY
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
## 2024-10-02 ENCOUNTER — TELEPHONE (OUTPATIENT)
Dept: ORTHOPEDIC SURGERY | Facility: CLINIC | Age: 34
End: 2024-10-02
Payer: COMMERCIAL

## 2024-10-04 ENCOUNTER — TELEPHONE (OUTPATIENT)
Dept: PRIMARY CARE | Facility: CLINIC | Age: 34
End: 2024-10-04
Payer: COMMERCIAL
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
## 2024-10-10 ENCOUNTER — APPOINTMENT (OUTPATIENT)
Dept: ORTHOPEDIC SURGERY | Facility: CLINIC | Age: 34
End: 2024-10-10
Payer: COMMERCIAL

## 2024-10-10 DIAGNOSIS — Z98.890 POST-OPERATIVE STATE: Primary | ICD-10-CM

## 2024-10-10 DIAGNOSIS — Z48.89 ENCOUNTER FOR POSTOPERATIVE WOUND CHECK: ICD-10-CM

## 2024-10-10 PROCEDURE — 99024 POSTOP FOLLOW-UP VISIT: CPT | Performed by: ORTHOPAEDIC SURGERY

## 2024-10-10 PROCEDURE — 1036F TOBACCO NON-USER: CPT | Performed by: ORTHOPAEDIC SURGERY

## 2024-10-10 NOTE — LETTER
October 10, 2024     Patient: Orlin Mckay   YOB: 1990   Date of Visit: 10/10/2024       To Whom It May Concern:    It is my medical opinion that Orlin Mckay  is able to return to work on 10-16-24 with the following restrictions:  He must wear his boot at all times, he is to be weight bearing as tolerated and no lifting greater than 30 lbs.  These restrictions  on 24 .    If you have any questions or concerns, please don't hesitate to call.         Sincerely,    Anastacio Pagan MD    CC: No Recipients

## 2024-10-10 NOTE — Clinical Note
October 10, 2024     Patient: Orlin Mcaky   YOB: 1990   Date of Visit: 10/10/2024       To Whom It May Concern:    It is my medical opinion that Orlin Mckay {Work release (duty restriction):73602}.    If you have any questions or concerns, please don't hesitate to call.         Sincerely,        Anastacio Pagan MD    CC: No Recipients

## 2024-10-14 ENCOUNTER — PHARMACY VISIT (OUTPATIENT)
Dept: PHARMACY | Facility: CLINIC | Age: 34
End: 2024-10-14
Payer: COMMERCIAL

## 2024-10-14 ENCOUNTER — OFFICE VISIT (OUTPATIENT)
Dept: WOUND CARE | Facility: CLINIC | Age: 34
End: 2024-10-14
Payer: COMMERCIAL

## 2024-10-14 PROCEDURE — 11042 DBRDMT SUBQ TIS 1ST 20SQCM/<: CPT

## 2024-10-14 PROCEDURE — RXMED WILLOW AMBULATORY MEDICATION CHARGE

## 2024-10-14 PROCEDURE — 99213 OFFICE O/P EST LOW 20 MIN: CPT | Mod: 25

## 2024-10-21 ENCOUNTER — OFFICE VISIT (OUTPATIENT)
Dept: WOUND CARE | Facility: CLINIC | Age: 34
End: 2024-10-21
Payer: COMMERCIAL

## 2024-10-21 PROCEDURE — 11042 DBRDMT SUBQ TIS 1ST 20SQCM/<: CPT

## 2024-10-24 ENCOUNTER — PATIENT OUTREACH (OUTPATIENT)
Dept: CARE COORDINATION | Facility: CLINIC | Age: 34
End: 2024-10-24
Payer: COMMERCIAL

## 2024-10-24 NOTE — PROGRESS NOTES
[facility] 30 day follow up outreach. Left message requesting return call.  Ortho appt 10/10, Wound clinic appt 10/28, PCP 11/12/24.  Lina NORRIS, [facility]  Office Phone: [phone]

## 2024-10-28 ENCOUNTER — OFFICE VISIT (OUTPATIENT)
Dept: WOUND CARE | Facility: CLINIC | Age: 34
End: 2024-10-28
Payer: COMMERCIAL

## 2024-10-28 PROCEDURE — 11042 DBRDMT SUBQ TIS 1ST 20SQCM/<: CPT

## 2024-11-01 ENCOUNTER — OFFICE VISIT (OUTPATIENT)
Dept: ORTHOPEDIC SURGERY | Facility: CLINIC | Age: 34
End: 2024-11-01
Payer: COMMERCIAL

## 2024-11-01 DIAGNOSIS — S99.911D INJURY OF RIGHT ANKLE, SUBSEQUENT ENCOUNTER: ICD-10-CM

## 2024-11-01 PROCEDURE — 99211 OFF/OP EST MAY X REQ PHY/QHP: CPT | Performed by: ORTHOPAEDIC SURGERY

## 2024-11-04 ENCOUNTER — OFFICE VISIT (OUTPATIENT)
Dept: WOUND CARE | Facility: CLINIC | Age: 34
End: 2024-11-04
Payer: COMMERCIAL

## 2024-11-04 PROCEDURE — 11042 DBRDMT SUBQ TIS 1ST 20SQCM/<: CPT

## 2024-11-12 ENCOUNTER — APPOINTMENT (OUTPATIENT)
Dept: PRIMARY CARE | Facility: CLINIC | Age: 34
End: 2024-11-12
Payer: COMMERCIAL

## 2024-11-12 VITALS
WEIGHT: 231 LBS | OXYGEN SATURATION: 97 % | HEART RATE: 82 BPM | BODY MASS INDEX: 31.32 KG/M2 | SYSTOLIC BLOOD PRESSURE: 130 MMHG | DIASTOLIC BLOOD PRESSURE: 70 MMHG

## 2024-11-12 DIAGNOSIS — R93.0 ABNORMAL CT SCAN OF HEAD: ICD-10-CM

## 2024-11-12 DIAGNOSIS — S99.911S INJURY OF RIGHT ANKLE, SEQUELA: ICD-10-CM

## 2024-11-12 DIAGNOSIS — Z13.1 SCREENING FOR DIABETES MELLITUS: ICD-10-CM

## 2024-11-12 DIAGNOSIS — E55.9 VITAMIN D DEFICIENCY: ICD-10-CM

## 2024-11-12 DIAGNOSIS — S82.891S CLOSED FRACTURE OF RIGHT ANKLE, SEQUELA: ICD-10-CM

## 2024-11-12 DIAGNOSIS — Z00.00 ANNUAL PHYSICAL EXAM: Primary | ICD-10-CM

## 2024-11-12 PROBLEM — S82.891A CLOSED FRACTURE OF RIGHT ANKLE: Status: ACTIVE | Noted: 2024-11-12

## 2024-11-12 PROCEDURE — 99203 OFFICE O/P NEW LOW 30 MIN: CPT | Performed by: NURSE PRACTITIONER

## 2024-11-12 PROCEDURE — 1036F TOBACCO NON-USER: CPT | Performed by: NURSE PRACTITIONER

## 2024-11-12 PROCEDURE — 99385 PREV VISIT NEW AGE 18-39: CPT | Performed by: NURSE PRACTITIONER

## 2024-11-12 RX ORDER — ACETAMINOPHEN 500 MG
TABLET ORAL EVERY 8 HOURS
COMMUNITY
Start: 2022-03-10

## 2024-11-12 ASSESSMENT — ANXIETY QUESTIONNAIRES
6. BECOMING EASILY ANNOYED OR IRRITABLE: NOT AT ALL
GAD7 TOTAL SCORE: 0
2. NOT BEING ABLE TO STOP OR CONTROL WORRYING: NOT AT ALL
1. FEELING NERVOUS, ANXIOUS, OR ON EDGE: NOT AT ALL
3. WORRYING TOO MUCH ABOUT DIFFERENT THINGS: NOT AT ALL
4. TROUBLE RELAXING: NOT AT ALL
5. BEING SO RESTLESS THAT IT IS HARD TO SIT STILL: NOT AT ALL
7. FEELING AFRAID AS IF SOMETHING AWFUL MIGHT HAPPEN: NOT AT ALL

## 2024-11-12 ASSESSMENT — PATIENT HEALTH QUESTIONNAIRE - PHQ9
2. FEELING DOWN, DEPRESSED OR HOPELESS: NOT AT ALL
SUM OF ALL RESPONSES TO PHQ9 QUESTIONS 1 AND 2: 0
1. LITTLE INTEREST OR PLEASURE IN DOING THINGS: NOT AT ALL

## 2024-11-12 ASSESSMENT — ENCOUNTER SYMPTOMS
LOSS OF SENSATION IN FEET: 0
ROS SKIN COMMENTS: RIGHT LOWER EXTREMITY.
OCCASIONAL FEELINGS OF UNSTEADINESS: 0
DEPRESSION: 0
WOUND: 1

## 2024-11-12 NOTE — PATIENT INSTRUCTIONS
I have ordered a repeat head CT scan for surveillance of abnormal CT scan of head on 9/22/2024.  Keep your predetermined consult with neurology on 4/15/2025.  Follow-up with me in 6 months.
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
## 2024-11-18 ENCOUNTER — OFFICE VISIT (OUTPATIENT)
Dept: WOUND CARE | Facility: CLINIC | Age: 34
End: 2024-11-18
Payer: COMMERCIAL

## 2024-11-18 PROCEDURE — 11042 DBRDMT SUBQ TIS 1ST 20SQCM/<: CPT

## 2024-11-19 ENCOUNTER — HOSPITAL ENCOUNTER (OUTPATIENT)
Dept: RADIOLOGY | Facility: HOSPITAL | Age: 34
Discharge: HOME | End: 2024-11-19
Payer: COMMERCIAL

## 2024-11-19 ENCOUNTER — TELEPHONE (OUTPATIENT)
Dept: PRIMARY CARE | Facility: CLINIC | Age: 34
End: 2024-11-19
Payer: COMMERCIAL

## 2024-11-19 DIAGNOSIS — R93.0 ABNORMAL CT SCAN OF HEAD: ICD-10-CM

## 2024-11-19 PROCEDURE — 70450 CT HEAD/BRAIN W/O DYE: CPT

## 2024-11-19 PROCEDURE — 70450 CT HEAD/BRAIN W/O DYE: CPT | Performed by: RADIOLOGY

## 2024-11-19 NOTE — TELEPHONE ENCOUNTER
----- Message from Earle Dotson sent at 11/19/2024  3:17 PM EST -----  Please tell patient that the CT scan of his head is unchanged from the one completed on 9/22/2024.

## 2024-11-21 ENCOUNTER — EVALUATION (OUTPATIENT)
Dept: PHYSICAL THERAPY | Facility: HOSPITAL | Age: 34
End: 2024-11-21
Payer: COMMERCIAL

## 2024-11-21 DIAGNOSIS — M25.671 DECREASED RANGE OF MOTION OF RIGHT ANKLE: Primary | ICD-10-CM

## 2024-11-21 DIAGNOSIS — S99.911D INJURY OF RIGHT ANKLE, SUBSEQUENT ENCOUNTER: ICD-10-CM

## 2024-11-21 PROCEDURE — 97161 PT EVAL LOW COMPLEX 20 MIN: CPT | Mod: GP | Performed by: PHYSICAL THERAPIST

## 2024-11-21 PROCEDURE — 97110 THERAPEUTIC EXERCISES: CPT | Mod: GP | Performed by: PHYSICAL THERAPIST

## 2024-11-21 ASSESSMENT — ENCOUNTER SYMPTOMS
OCCASIONAL FEELINGS OF UNSTEADINESS: 0
LOSS OF SENSATION IN FEET: 0
DEPRESSION: 0

## 2024-11-21 ASSESSMENT — PAIN - FUNCTIONAL ASSESSMENT: PAIN_FUNCTIONAL_ASSESSMENT: 0-10

## 2024-11-21 ASSESSMENT — PAIN SCALES - GENERAL: PAINLEVEL_OUTOF10: 0 - NO PAIN

## 2024-11-21 ASSESSMENT — PAIN DESCRIPTION - DESCRIPTORS: DESCRIPTORS: ACHING
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
## 2024-11-22 ENCOUNTER — TELEMEDICINE (OUTPATIENT)
Dept: NEUROLOGY | Facility: HOSPITAL | Age: 34
End: 2024-11-22
Payer: COMMERCIAL

## 2024-11-22 DIAGNOSIS — Z87.828 HISTORY OF MOTOR VEHICLE ACCIDENT: ICD-10-CM

## 2024-11-22 DIAGNOSIS — R93.0 ABNORMAL HEAD CT: Primary | ICD-10-CM

## 2024-11-22 PROCEDURE — 1036F TOBACCO NON-USER: CPT | Performed by: PSYCHIATRY & NEUROLOGY

## 2024-11-22 PROCEDURE — 99204 OFFICE O/P NEW MOD 45 MIN: CPT | Performed by: PSYCHIATRY & NEUROLOGY

## 2024-11-22 RX ORDER — IBUPROFEN 200 MG
200 TABLET ORAL EVERY 6 HOURS
COMMUNITY

## 2024-11-22 NOTE — PATIENT INSTRUCTIONS
Discussed options:  Do nothing, reimage as needed, OR  Consider repeat MRI brain wwo in 3 mo and if still stable stop there    After much discussions, pt comfortable with doing nothing and reimaging prn at this time, which I think is reasonable.    Rtc as needed in gen neuro clinic
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
## 2024-11-25 ENCOUNTER — PATIENT OUTREACH (OUTPATIENT)
Dept: CARE COORDINATION | Facility: CLINIC | Age: 34
End: 2024-11-25
Payer: COMMERCIAL

## 2024-12-02 ENCOUNTER — OFFICE VISIT (OUTPATIENT)
Dept: WOUND CARE | Facility: CLINIC | Age: 34
End: 2024-12-02
Payer: COMMERCIAL

## 2024-12-02 PROCEDURE — 99212 OFFICE O/P EST SF 10 MIN: CPT

## 2024-12-03 ENCOUNTER — APPOINTMENT (OUTPATIENT)
Dept: ORTHOPEDIC SURGERY | Facility: CLINIC | Age: 34
End: 2024-12-03
Payer: COMMERCIAL

## 2024-12-03 DIAGNOSIS — Z98.890 POST-OPERATIVE STATE: Primary | ICD-10-CM

## 2024-12-03 PROCEDURE — 99024 POSTOP FOLLOW-UP VISIT: CPT | Performed by: ORTHOPAEDIC SURGERY

## 2024-12-03 NOTE — PROGRESS NOTES
History of Present Illness   Status post irrigation and debridement of open right ankle on 9/23/2024.  He has minimal pain.  He has been in physical therapy, however he has had difficulty getting appointments on time.  He has been seen by the wound care clinic who has discharged him.  He has been back to work.     Review of Systems   GENERAL: Negative for malaise, significant weight loss, fever  MUSCULOSKELETAL: see HPI  NEURO:  Negative     No past medical history on file.     Medication Documentation Review Audit       Reviewed by Romel Rivas MD (Physician) on 11/22/24 at 0907      Medication Order Taking? Sig Documenting Provider Last Dose Status    Patient not taking:   Discontinued 11/22/24 0907    Patient not taking:   Discontinued 11/22/24 0907   ibuprofen 200 mg tablet [id number] Yes Take 1 tablet (200 mg) by mouth every 6 hours. Historical Provider, MD  Active                     Physical Exam  Right ankle  His incisions are well-healed he has 1 small area of superficial scabbing over the medial incision.  No tenderness to palpation  Mild swelling appreciated about the ankle  Intact plantar and dorsiflexion and inversion and eversion  He is distally neurovascularly intact     Imaging  CT head wo IV contrast  Narrative: Interpreted By:  Hernan Wheeler and Stephens Katherine   STUDY:  CT HEAD WO IV CONTRAST;  11/19/2024 2:27 pm      INDICATION:  Signs/Symptoms:Surveillance of abnormal head CT scan complete 9/22/24.      ,R93.0 Abnormal findings on diagnostic imaging of skull and head, not  elsewhere classified      COMPARISON:  CT head 09/22/2024      ACCESSION NUMBER(S):  [id number]      ORDERING CLINICIAN:  SHAHID LAURENT      TECHNIQUE:  Noncontrast axial CT scan of head was performed. Angled reformats in  brain and bone windows were generated. The images were reviewed in  bone, brain, blood and soft tissue windows.      FINDINGS:  Unchanged minimal asymmetric hyperdensity along the left  tentorium  cerebelli, similar to prior (series 5, image 76).      The ventricles, sulci, and basal cisterns are within normal limits.      The gray-white differentiation is intact. No mass effect or midline  shift. The calvarium is intact. Mucous retention cyst versus polyp in  the right sphenoid sinus. Otherwise the visualized paranasal sinuses  and mastoid air cells are essentially clear.      Impression: Unchanged minimal asymmetric hyperdensity along the left tentorium  cerebelli which may represent dural thickening versus small amount of  hemorrhage though relative stability over 2 months is atypical.      No acute infarct or new/enlarging hemorrhage.      I personally reviewed the images/study and I agree with Noreen Mckay DO's (radiology resident) findings as stated. This study  was interpreted at [facility],  [place], [place].      MACRO:  None      Signed by: Hernan Wheeler 11/19/2024 3:08 PM  Dictation workstation:   [id number]       Assessment   Status post irrigation and debridement of right open ankle wound     Plan  Continue with physical therapy until completed  Compression socks for swelling  Okay to return to work full duty on Monday, 12/9/2024 with no limitations or restrictions  Follow-up as needed  All questions answered

## 2024-12-03 NOTE — LETTER
December 3, 2024     Patient: Orlin Mckay   YOB: 1990   Date of Visit: 12/3/2024       To Whom It May Concern:    It is my medical opinion that Orlin Mckay may return to work on 12-09-24 full duty, no restrictions or limitations on his activities .    If you have any questions or concerns, please don't hesitate to call.         Sincerely,        Anastacio Pagan MD    CC: No Recipients

## 2024-12-04 ENCOUNTER — TREATMENT (OUTPATIENT)
Dept: PHYSICAL THERAPY | Facility: HOSPITAL | Age: 34
End: 2024-12-04
Payer: COMMERCIAL

## 2024-12-04 DIAGNOSIS — M25.671 DECREASED RANGE OF MOTION OF RIGHT ANKLE: ICD-10-CM

## 2024-12-04 DIAGNOSIS — S99.911D INJURY OF RIGHT ANKLE, SUBSEQUENT ENCOUNTER: ICD-10-CM

## 2024-12-04 ASSESSMENT — PAIN - FUNCTIONAL ASSESSMENT: PAIN_FUNCTIONAL_ASSESSMENT: 0-10

## 2024-12-04 ASSESSMENT — PAIN SCALES - GENERAL: PAINLEVEL_OUTOF10: 0 - NO PAIN

## 2024-12-04 ASSESSMENT — PAIN DESCRIPTION - DESCRIPTORS: DESCRIPTORS: ACHING

## 2024-12-04 NOTE — PROGRESS NOTES
"Physical Therapy    Physical Therapy Treatment    Patient Name: Orlin Mckay  MRN: [id number]  : 1990  Today's Date: 2024  Time Calculation  Start Time: 1345  Stop Time: 1430  Time Calculation (min): 45 min    PT Therapeutic Procedures Time Entry  Manual Therapy Time Entry: 10  Therapeutic Exercise Time Entry: 35          VISIT:# 2    Current Problem  Problem List Items Addressed This Visit             ICD-10-CM       Musculoskeletal and Injuries    Decreased range of motion of right ankle M25.671    Injury of right ankle S99.911A        Subjective   Name and  confirmed at the beginning of the session. Pt reported no pain in R ankle but states that it feels stiff today. He arrived wearing a fabric ankle brace that he states he does not have to wear unless he feels he needs to according to his Dr. He has had no recent falls.     Precautions  Precautions  STEADI Fall Risk Score (The score of 4 or more indicates an increased risk of falling): 0  Precautions Comment: Lateral ankle wound       Pain  Pain Assessment: 0-10  0-10 (Numeric) Pain Score: 0 - No pain  Pain Location: Ankle  Pain Orientation: Right  Pain Descriptors: Aching  Pain Frequency: Intermittent       Objective            Treatments:  EXERCISES Date 2024  Date  Date Date   VISIT# #2 # # #    REPS REPS REPS REPS          Nu-Step L4 10 min             Parallel bars:       4-way wobbleboard (seated) 20x A/P and M/L      DF stretch RB 3x30\"H                    Shuttle:       DLP 6B 2x10      SLP 5B 2x10 Osmel      TR 5B 2x10                    Ankle mobs       Manual 10 min w/ mobs             Seated:       Towel scrunches 1x5      [place]  Next                           CP with [facility] prn                     HEP: Access Code: [email or web address]  y: Abdon Chase    Exercises  - Seated Ankle Pumps on Table  - 2 x daily - 1-2 sets - 20 reps  - Seated Ankle Circles  - 2 x daily - 1-2 sets - 20 reps  - Seated Ankle Alphabet  - 2 x daily - 1 sets  - " Seated Gastroc Stretch with Strap  - 2 x daily - 3 sets - 30 seconds hold Reviewed w/ Pt          Assessment:  At the end of the session pt stated that he still had no pain (0/10) in his R ankle. He tolerated treatment well. He also noted that his ankle felt less stiff after treatment. HEP was reviewed w/ pt and he was able to have 90% teach back of his exercises.      Plan:  OP PT Plan  Treatment/Interventions: Cryotherapy, Education/ Instruction, Electrical stimulation, Gait training, Manual therapy, Therapeutic exercises  PT Plan: Skilled PT  PT Frequency: 2 times per week  Duration: 6-8 wks  Rehab Potential: Excellent  Plan of Care Agreement: Patient    Goals:  Active       Right ankle surgery       Right ankle AROM WNL for improved gait and balance.        Start:  11/21/24    Expected End:  02/19/25            <=1/10 ankle pain for improved walking tolerance.        Start:  11/21/24    Expected End:  02/19/25            5/5 right ankle strength for improved gait and balance.        Start:  11/21/24    Expected End:  02/19/25            Pt will walk with min/no gait deviation for improved community negotiation.         Start:  11/21/24    Expected End:  02/19/25            Improve LEFS score >=20 points for improved mobility.        Start:  11/21/24    Expected End:  02/19/25            Independent HEP for continued gains after PT is complete.        Start:  11/21/24    Expected End:  02/19/25

## 2024-12-10 ENCOUNTER — TREATMENT (OUTPATIENT)
Dept: PHYSICAL THERAPY | Facility: HOSPITAL | Age: 34
End: 2024-12-10
Payer: COMMERCIAL

## 2024-12-10 DIAGNOSIS — M25.671 DECREASED RANGE OF MOTION OF RIGHT ANKLE: ICD-10-CM

## 2024-12-10 DIAGNOSIS — S99.911D INJURY OF RIGHT ANKLE, SUBSEQUENT ENCOUNTER: ICD-10-CM

## 2024-12-10 PROCEDURE — 97110 THERAPEUTIC EXERCISES: CPT | Mod: GP,CQ

## 2024-12-10 PROCEDURE — 97140 MANUAL THERAPY 1/> REGIONS: CPT | Mod: GP,CQ

## 2024-12-10 ASSESSMENT — PAIN DESCRIPTION - DESCRIPTORS: DESCRIPTORS: TIGHTNESS

## 2024-12-10 ASSESSMENT — PAIN SCALES - GENERAL: PAINLEVEL_OUTOF10: 1

## 2024-12-10 ASSESSMENT — PAIN - FUNCTIONAL ASSESSMENT: PAIN_FUNCTIONAL_ASSESSMENT: 0-10

## 2024-12-10 NOTE — PROGRESS NOTES
"Physical Therapy    Physical Therapy Treatment    Patient Name: Orlin Mckay  MRN: [id number]  : 1990   Today's Date: 12/10/2024  Time Calculation  Start Time: 1345  Stop Time: 1423  Time Calculation (min): 38 min     PT Therapeutic Procedures Time Entry  Manual Therapy Time Entry: 10  Therapeutic Exercise Time Entry: 28                 Visit Number: 3/16  Auth Dates: 24 - 24     Current Problem  Problem List Items Addressed This Visit             ICD-10-CM    Injury of right ankle S99.911A    Decreased range of motion of right ankle M25.671        Subjective   Pt states he no longer seeing wound care and has discontinued use of R walking boot. Pt states he has not worn R ankle brace in about a week. Pt reports compliance with HEP and no recent falls, states he continues to have swelling when on his foot for prolonged periods of time and stiffness in the mornings.   Precautions  Precautions  Precautions Comment: Lateral ankle wound    Pain  Pain Assessment: 0-10  0-10 (Numeric) Pain Score: 1  Pain Location: Ankle  Pain Orientation: Right  Pain Descriptors: Tightness  Pain Frequency: Intermittent      Objective   Edema noted around R medial and lateral malleolus     Treatments:  EXERCISES Date 2024  Date 12/10/2024 Date Date   VISIT# #2 #3 # #    REPS REPS REPS REPS          Nu-Step L4 10 min L4 10min            Parallel bars:       4-way wobbleboard (seated) 20x A/P and M/L X20 A/P, M/L, CW/CCW     DF stretch RB 3x30\"H RB gastroc & soleus [id number] ea     RB A/P & M/L       BAPS board              Toe walking        Heel walking              Shuttle:       DLP 6B 2x10      SLP 5B 2x10 Osmel      TR 5B 2x10             Ankle mobs       Manual 10 min w/ mobs 10min w/mobs            Seated:       Towel scrunches 1x5 HEP     [place]  Next 2 min            CP with [facility] prn              HEP: Access Code: [email or web address]  y: Abdon Chase    Exercises  - Seated Ankle Pumps on Table  - 2 x daily - 1-2 " sets - 20 reps  - Seated Ankle Circles  - 2 x daily - 1-2 sets - 20 reps  - Seated Ankle Alphabet  - 2 x daily - 1 sets  - Seated Gastroc Stretch with Strap  - 2 x daily - 3 sets - 30 seconds hold Reviewed w/ Pt          Assessment:  Pt tolerated all exercises well with minimal difficulty reporting no change in pain throughout session. Shortened treatment due to patient stating he has to return to work.     Plan:  Continue to improve R ankle ROM and stability as tolerated for return to PLOF  OP PT Plan  Treatment/Interventions: Cryotherapy, Education/ Instruction, Electrical stimulation, Gait training, Manual therapy, Therapeutic exercises  PT Plan: Skilled PT  PT Frequency: 2 times per week  Duration: 6-8 wks  Rehab Potential: Excellent  Plan of Care Agreement: Patient    Goals:  Active       Right ankle surgery       Right ankle AROM WNL for improved gait and balance.        Start:  11/21/24    Expected End:  02/19/25            <=1/10 ankle pain for improved walking tolerance.        Start:  11/21/24    Expected End:  02/19/25            5/5 right ankle strength for improved gait and balance.        Start:  11/21/24    Expected End:  02/19/25            Pt will walk with min/no gait deviation for improved community negotiation.         Start:  11/21/24    Expected End:  02/19/25            Improve LEFS score >=20 points for improved mobility.        Start:  11/21/24    Expected End:  02/19/25            Independent HEP for continued gains after PT is complete.        Start:  11/21/24    Expected End:  02/19/25

## 2024-12-12 ENCOUNTER — TREATMENT (OUTPATIENT)
Dept: PHYSICAL THERAPY | Facility: HOSPITAL | Age: 34
End: 2024-12-12
Payer: COMMERCIAL

## 2024-12-12 DIAGNOSIS — M25.671 DECREASED RANGE OF MOTION OF RIGHT ANKLE: Primary | ICD-10-CM

## 2024-12-12 DIAGNOSIS — S99.911D INJURY OF RIGHT ANKLE, SUBSEQUENT ENCOUNTER: ICD-10-CM

## 2024-12-12 PROCEDURE — 97110 THERAPEUTIC EXERCISES: CPT | Mod: GP | Performed by: PHYSICAL THERAPIST

## 2024-12-12 PROCEDURE — 97140 MANUAL THERAPY 1/> REGIONS: CPT | Mod: GP | Performed by: PHYSICAL THERAPIST

## 2024-12-12 ASSESSMENT — PAIN DESCRIPTION - DESCRIPTORS: DESCRIPTORS: TIGHTNESS

## 2024-12-12 ASSESSMENT — PAIN SCALES - GENERAL: PAINLEVEL_OUTOF10: 1

## 2024-12-12 ASSESSMENT — PAIN - FUNCTIONAL ASSESSMENT: PAIN_FUNCTIONAL_ASSESSMENT: 0-10

## 2024-12-12 NOTE — PROGRESS NOTES
"Physical Therapy    Physical Therapy Treatment    Patient Name: Orlin Mckay  MRN: [id number]  : 1990   Today's Date: 2024  Time Calculation  Start Time: 1430  Stop Time: 1510  Time Calculation (min): 40 min       PT Therapeutic Procedures Time Entry  Manual Therapy Time Entry: 10  Therapeutic Exercise Time Entry: 30         Visit #4    Assessment:   Advanced ankle ROM exercises to a weightbearing position with good pt tolerance. Very stiff with mobs.    Pt reports 0/10 pain after treatment.    Plan:   Add rockerboard motion A/P, M/L.    Current Problem  1. Decreased range of motion of right ankle  Follow Up In Physical Therapy      2. Injury of right ankle, subsequent encounter  Follow Up In Physical Therapy          Subjective   General  Pt states only a little stiffness in his ankle, wearing a compression sock helps. Continues to work on HEP.    Precautions  Precautions  Precautions Comment: Lateral ankle wound    Pain  Pain Assessment: 0-10  0-10 (Numeric) Pain Score: 1  Pain Location: Ankle  Pain Orientation: Right  Pain Descriptors: Tightness    Objective     Treatments:  EXERCISES Date 2024  Date 12/10/2024 Date 2024  Date   VISIT# #2 #3 #4 #    REPS REPS REPS REPS          Nu-Step L4 10 min L4 10min L4 10'           Parallel bars:       4-way wobbleboard (seated) 20x A/P and M/L X20 A/P, M/L, CW/CCW Standing - 20x ea    DF stretch RB 3x30\"H RB gastroc & soleus [id number] ea Gastroc and soleus, 3x30\" ea    RB A/P & M/L       BAPS board              Toe walking        Heel walking              Shuttle:       DLP 6B 2x10  6B 2x10    SLP 5B 2x10 Osmel  5B 2x10    TR 5B 2x10  5B 2x10           Ankle mobs   10'    Manual 10 min w/ mobs 10min w/mobs            Seated:       Towel scrunches 1x5 HEP     [place]  Next 2 min            CP with [facility] prn              HEP: Access Code: [email or web address]  y: Abdon Chase    Exercises  - Seated Ankle Pumps on Table  - 2 x daily - 1-2 sets - 20 reps  - " Seated Ankle Circles  - 2 x daily - 1-2 sets - 20 reps  - Seated Ankle Alphabet  - 2 x daily - 1 sets  - Seated Gastroc Stretch with Strap  - 2 x daily - 3 sets - 30 seconds hold Reviewed w/ Pt        Goals:  Active       Right ankle surgery       Right ankle AROM WNL for improved gait and balance.        Start:  11/21/24    Expected End:  02/19/25            <=1/10 ankle pain for improved walking tolerance.        Start:  11/21/24    Expected End:  02/19/25            5/5 right ankle strength for improved gait and balance.        Start:  11/21/24    Expected End:  02/19/25            Pt will walk with min/no gait deviation for improved community negotiation.         Start:  11/21/24    Expected End:  02/19/25            Improve LEFS score >=20 points for improved mobility.        Start:  11/21/24    Expected End:  02/19/25            Independent HEP for continued gains after PT is complete.        Start:  11/21/24    Expected End:  02/19/25

## 2024-12-17 ENCOUNTER — TREATMENT (OUTPATIENT)
Dept: PHYSICAL THERAPY | Facility: HOSPITAL | Age: 34
End: 2024-12-17
Payer: COMMERCIAL

## 2024-12-17 DIAGNOSIS — M25.671 DECREASED RANGE OF MOTION OF RIGHT ANKLE: ICD-10-CM

## 2024-12-17 DIAGNOSIS — S99.911D INJURY OF RIGHT ANKLE, SUBSEQUENT ENCOUNTER: ICD-10-CM

## 2024-12-17 PROCEDURE — 97110 THERAPEUTIC EXERCISES: CPT | Mod: GP,CQ

## 2024-12-17 ASSESSMENT — PAIN SCALES - GENERAL: PAINLEVEL_OUTOF10: 0 - NO PAIN

## 2024-12-17 ASSESSMENT — PAIN - FUNCTIONAL ASSESSMENT: PAIN_FUNCTIONAL_ASSESSMENT: 0-10

## 2024-12-17 ASSESSMENT — PAIN DESCRIPTION - DESCRIPTORS: DESCRIPTORS: TIGHTNESS

## 2024-12-17 NOTE — PROGRESS NOTES
"Physical Therapy    Physical Therapy Treatment    Patient Name: Orlin Mckay  MRN: [id number]  : 1990   Today's Date: 2024  Time Calculation  Start Time: 1350  Stop Time: 1428  Time Calculation (min): 38 min     PT Therapeutic Procedures Time Entry  Therapeutic Exercise Time Entry: 38                 Visit Number: 516  Auth Dates: 24 - 24     Current Problem  Problem List Items Addressed This Visit             ICD-10-CM    Injury of right ankle S99.911A    Decreased range of motion of right ankle M25.671        Subjective   Pt reports no pain in R ankle, states his foot feel tight. Pt states his foot feels uncomfortable when he walks without a shoe on.   Precautions  Precautions  Precautions Comment: Lateral ankle wound    Pain  Pain Assessment: 0-10  0-10 (Numeric) Pain Score: 0 - No pain  Pain Location: Ankle  Pain Orientation: Right  Pain Descriptors: Tightness      Objective     Treatments:  EXERCISES Date 2024  Date 12/10/2024 Date 2024  Date 2024   VISIT# #2 #3 #4 #5    REPS REPS REPS REPS          Nu-Step L4 10 min L4 10min L4 10'    Upright Bike    8 min          Parallel bars:       4-way wobbleboard (seated) 20x A/P and M/L X20 A/P, M/L, CW/CCW Standing - 20x ea Standing - 20x ea FWB   DF stretch RB 3x30\"H RB gastroc & soleus 1v19mtf ea Gastroc and soleus, 3x30\" ea Gastroc and soleus, 3x30\" ea   RB A/P & M/L    X20ea    BAPS board              Toe walking        Heel walking              Shuttle:       DLP 6B 2x10  6B 2x10 6B 2x10   SLP 5B 2x10 Osmel  5B 2x10 5B [id number]    TR 5B 2x10  5B 2x10           Ankle mobs   10'    Manual 10 min w/ mobs 10min w/mobs            Seated:       Towel scrunches 1x5 HEP     [place]  Next 2 min            CP with [facility] prn              HEP: Access Code: [email or web address]  y: Abdon Chase    Exercises  - Seated Ankle Pumps on Table  - 2 x daily - 1-2 sets - 20 reps  - Seated Ankle Circles  - 2 x daily - 1-2 sets - 20 reps  - Seated " Ankle Alphabet  - 2 x daily - 1 sets  - Seated Gastroc Stretch with Strap  - 2 x daily - 3 sets - 30 seconds hold Reviewed w/ Pt          Assessment:   Pt tolerated all exercises well with minimal difficulty reporting no pain through out session.     Plan:   Continue to improve R ankle ROM and stability as tolerated     Goals:  Active       Right ankle surgery       Right ankle AROM WNL for improved gait and balance.        Start:  11/21/24    Expected End:  02/19/25            <=1/10 ankle pain for improved walking tolerance.        Start:  11/21/24    Expected End:  02/19/25            5/5 right ankle strength for improved gait and balance.        Start:  11/21/24    Expected End:  02/19/25            Pt will walk with min/no gait deviation for improved community negotiation.         Start:  11/21/24    Expected End:  02/19/25            Improve LEFS score >=20 points for improved mobility.        Start:  11/21/24    Expected End:  02/19/25            Independent HEP for continued gains after PT is complete.        Start:  11/21/24    Expected End:  02/19/25

## 2024-12-19 ENCOUNTER — TREATMENT (OUTPATIENT)
Dept: PHYSICAL THERAPY | Facility: HOSPITAL | Age: 34
End: 2024-12-19
Payer: COMMERCIAL

## 2024-12-19 DIAGNOSIS — S99.911D INJURY OF RIGHT ANKLE, SUBSEQUENT ENCOUNTER: ICD-10-CM

## 2024-12-19 DIAGNOSIS — M25.671 DECREASED RANGE OF MOTION OF RIGHT ANKLE: ICD-10-CM

## 2024-12-19 PROCEDURE — 97140 MANUAL THERAPY 1/> REGIONS: CPT | Mod: GP | Performed by: PHYSICAL THERAPIST

## 2024-12-19 PROCEDURE — 97110 THERAPEUTIC EXERCISES: CPT | Mod: GP | Performed by: PHYSICAL THERAPIST

## 2024-12-19 ASSESSMENT — PAIN SCALES - GENERAL: PAINLEVEL_OUTOF10: 0 - NO PAIN

## 2024-12-19 ASSESSMENT — PAIN - FUNCTIONAL ASSESSMENT: PAIN_FUNCTIONAL_ASSESSMENT: 0-10

## 2024-12-19 NOTE — PROGRESS NOTES
"Physical Therapy    Physical Therapy Treatment / Discharge    Patient Name: Orlin Mckay  MRN: [id number]  : 1990   Today's Date: 2024  Time Calculation  Start Time: 1345  Stop Time: 1430  Time Calculation (min): 45 min       PT Therapeutic Procedures Time Entry  Manual Therapy Time Entry: 10  Therapeutic Exercise Time Entry: 35          Visit #6    Assessment:   Pt with improved ROM, strength and gait. He has met most PT goals and has an independent HEP to continue to work on ROM and strengthening,     Pt reports 0/10 pain after treatment.    Plan:   Discharge to independent HEP.    Current Problem  1. Injury of right ankle, subsequent encounter  Follow Up In Physical Therapy      2. Decreased range of motion of right ankle  Follow Up In Physical Therapy          Subjective   General  Pt states he has been doing well as long as he's in shoes, more uncomfortable when barefoot. Feels like the ankle is stable, able to stand/walk as long as desired.     Precautions  Precautions  Precautions Comment: Lateral ankle wound    Pain  Pain Assessment: 0-10  0-10 (Numeric) Pain Score: 0 - No pain    Objective   Gait: Slightly early heel-off    R ankle AROM:  PF: 30 deg  DF: 11 deg  INV: 46deg  EV: 11 deg    LE Strength:  5/5    SLS: held 30\"/30\" with sway but no LOB    Flexibility:  DF: 11 deg R    Decreased mobility right subtalar, talocrural joints    Outcome Measures:  Other Measures  Lower Extremity Funtional Score (LEFS): 71 (improved from 40)    Treatments:  EXERCISES Date 12/10/2024 Date 2024  Date 2024 Date 2024    VISIT# #3 #4 #5 #6    REPS REPS REPS           Nu-Step L4 10min L4 10'  L4 10'   Upright Bike   8 min           Parallel bars:       4-way wobbleboard (seated) X20 A/P, M/L, CW/CCW Standing - 20x ea Standing - 20x ea FWB    DF stretch RB gastroc & soleus 6n45mgt ea Gastroc and soleus, 3x30\" ea Gastroc and soleus, 3x30\" ea    RB A/P & M/L   X20ea     BAPS board            "   Toe walking        Heel walking              Shuttle:       DLP  6B 2x10 6B 2x10    SLP  5B 2x10 5B [id number]     TR  5B 2x10            Ankle mobs  10'  10'   Manual 10min w/mobs             Seated:       Towel scrunches HEP      [place]  2 min             CP with [facility] prn           Reassess and review/progress HEP - 20 min   HEP: Access Code: [email or web address]  y: Abdon Chase    Exercises  - Seated Ankle Pumps on Table  - 2 x daily - 1-2 sets - 20 reps  - Seated Ankle Circles  - 2 x daily - 1-2 sets - 20 reps  - Seated Ankle Alphabet  - 2 x daily - 1 sets  - Seated Gastroc Stretch with Strap  - 2 x daily - 3 sets - 30 seconds hold    Access Code: [email or web address]  URL: [email or web address]  Date: 12/19/2024  Prepared by: Abdon Chase    Exercises  - Standing Gastroc Stretch  - 2 x daily - 3 sets - 30 seconds hold  - Standing Soleus Stretch  - 2 x daily - 3 sets - 30 seconds hold  - Single Leg Stance on Foam Pad  - 2 x daily - 1-2 sets - 60 seconds hold  - Seated Ankle Inversion Eversion PROM  - 2 x daily - 2 sets - 10 reps - 3 seconds hold     Goals:  Resolved       Right ankle surgery       Right ankle AROM WNL for improved gait and balance.  (Adequate for Discharge)       Start:  11/21/24    Expected End:  02/19/25    Resolved:  12/19/24         <=1/10 ankle pain for improved walking tolerance.  (Met)       Start:  11/21/24    Expected End:  02/19/25    Resolved:  12/19/24         5/5 right ankle strength for improved gait and balance.  (Met)       Start:  11/21/24    Expected End:  02/19/25    Resolved:  12/19/24         Pt will walk with min/no gait deviation for improved community negotiation.   (Adequate for Discharge)       Start:  11/21/24    Expected End:  02/19/25    Resolved:  12/19/24         Improve LEFS score >=20 points for improved mobility.  (Met)       Start:  11/21/24    Expected End:  02/19/25    Resolved:  12/19/24         Independent HEP for continued gains after PT is complete.   (Met)       Start:  11/21/24    Expected End:  02/19/25    Resolved:  12/19/24

## 2024-12-30 ENCOUNTER — APPOINTMENT (OUTPATIENT)
Dept: PHYSICAL THERAPY | Facility: HOSPITAL | Age: 34
End: 2024-12-30
Payer: COMMERCIAL

## 2025-03-27 DIAGNOSIS — M79.671 FOOT PAIN, RIGHT: ICD-10-CM

## 2025-03-31 ENCOUNTER — HOSPITAL ENCOUNTER (OUTPATIENT)
Dept: RADIOLOGY | Facility: HOSPITAL | Age: 35
Discharge: HOME | End: 2025-03-31
Payer: COMMERCIAL

## 2025-03-31 DIAGNOSIS — M79.671 FOOT PAIN, RIGHT: ICD-10-CM

## 2025-03-31 PROCEDURE — 73630 X-RAY EXAM OF FOOT: CPT | Mod: RIGHT SIDE | Performed by: RADIOLOGY

## 2025-03-31 PROCEDURE — 73630 X-RAY EXAM OF FOOT: CPT | Mod: RT

## 2025-04-01 ENCOUNTER — OFFICE VISIT (OUTPATIENT)
Dept: ORTHOPEDIC SURGERY | Facility: HOSPITAL | Age: 35
End: 2025-04-01
Payer: COMMERCIAL

## 2025-04-01 ENCOUNTER — APPOINTMENT (OUTPATIENT)
Dept: RADIOLOGY | Facility: HOSPITAL | Age: 35
End: 2025-04-01
Payer: COMMERCIAL

## 2025-04-01 VITALS — WEIGHT: 231 LBS | BODY MASS INDEX: 31.29 KG/M2 | HEIGHT: 72 IN

## 2025-04-01 DIAGNOSIS — S99.911D INJURY OF RIGHT ANKLE, SUBSEQUENT ENCOUNTER: ICD-10-CM

## 2025-04-01 DIAGNOSIS — S92.001A: ICD-10-CM

## 2025-04-01 DIAGNOSIS — M19.071 ARTHRITIS OF RIGHT SUBTALAR JOINT: Primary | ICD-10-CM

## 2025-04-01 PROCEDURE — 99214 OFFICE O/P EST MOD 30 MIN: CPT | Performed by: SPECIALIST

## 2025-04-01 PROCEDURE — 3008F BODY MASS INDEX DOCD: CPT | Performed by: SPECIALIST

## 2025-04-01 PROCEDURE — 1036F TOBACCO NON-USER: CPT | Performed by: SPECIALIST
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
## 2025-04-04 ENCOUNTER — HOSPITAL ENCOUNTER (OUTPATIENT)
Dept: RADIOLOGY | Facility: HOSPITAL | Age: 35
Discharge: HOME | End: 2025-04-04
Payer: COMMERCIAL

## 2025-04-04 DIAGNOSIS — S92.001A: ICD-10-CM

## 2025-04-04 DIAGNOSIS — S99.911D INJURY OF RIGHT ANKLE, SUBSEQUENT ENCOUNTER: ICD-10-CM

## 2025-04-04 PROCEDURE — 73721 MRI JNT OF LWR EXTRE W/O DYE: CPT | Mod: RT

## 2025-04-08 ENCOUNTER — TELEPHONE (OUTPATIENT)
Dept: ORTHOPEDIC SURGERY | Facility: CLINIC | Age: 35
End: 2025-04-08
Payer: COMMERCIAL

## 2025-04-15 ENCOUNTER — APPOINTMENT (OUTPATIENT)
Dept: NEUROLOGY | Facility: HOSPITAL | Age: 35
End: 2025-04-15
Payer: COMMERCIAL

## 2025-04-16 NOTE — TELEPHONE ENCOUNTER
I spoke to Orlin and reviewed the results with him.  He states he does already have a Wraptor brace and will try that for a few weeks and if no improvement he will call to come in for an injection.

## 2025-04-19 ENCOUNTER — APPOINTMENT (OUTPATIENT)
Dept: RADIOLOGY | Facility: HOSPITAL | Age: 35
End: 2025-04-19
Payer: COMMERCIAL

## 2025-05-09 LAB
25(OH)D3+25(OH)D2 SERPL-MCNC: 23 NG/ML (ref 30–100)
ALBUMIN SERPL-MCNC: 4.9 G/DL (ref 3.6–5.1)
ALP SERPL-CCNC: 66 U/L (ref 36–130)
ALT SERPL-CCNC: 61 U/L (ref 9–46)
ANION GAP SERPL CALCULATED.4IONS-SCNC: 9 MMOL/L (CALC) (ref 7–17)
AST SERPL-CCNC: 31 U/L (ref 10–40)
BASOPHILS # BLD AUTO: 48 CELLS/UL (ref 0–200)
BASOPHILS NFR BLD AUTO: 0.9 %
BILIRUB SERPL-MCNC: 0.5 MG/DL (ref 0.2–1.2)
BUN SERPL-MCNC: 11 MG/DL (ref 7–25)
CALCIUM SERPL-MCNC: 9.6 MG/DL (ref 8.6–10.3)
CHLORIDE SERPL-SCNC: 103 MMOL/L (ref 98–110)
CHOLEST SERPL-MCNC: 200 MG/DL
CHOLEST/HDLC SERPL: 4.1 (CALC)
CO2 SERPL-SCNC: 29 MMOL/L (ref 20–32)
CREAT SERPL-MCNC: 0.9 MG/DL (ref 0.6–1.26)
EGFRCR SERPLBLD CKD-EPI 2021: 115 ML/MIN/1.73M2
EOSINOPHIL # BLD AUTO: 159 CELLS/UL (ref 15–500)
EOSINOPHIL NFR BLD AUTO: 3 %
ERYTHROCYTE [DISTWIDTH] IN BLOOD BY AUTOMATED COUNT: 12.5 % (ref 11–15)
EST. AVERAGE GLUCOSE BLD GHB EST-MCNC: 105 MG/DL
EST. AVERAGE GLUCOSE BLD GHB EST-SCNC: 5.8 MMOL/L
GLUCOSE SERPL-MCNC: 94 MG/DL (ref 65–99)
HBA1C MFR BLD: 5.3 %
HCT VFR BLD AUTO: 48.4 % (ref 38.5–50)
HDLC SERPL-MCNC: 49 MG/DL
HGB BLD-MCNC: 16.7 G/DL (ref 13.2–17.1)
LDLC SERPL CALC-MCNC: 120 MG/DL (CALC)
LYMPHOCYTES # BLD AUTO: 2125 CELLS/UL (ref 850–3900)
LYMPHOCYTES NFR BLD AUTO: 40.1 %
MCH RBC QN AUTO: 30.6 PG (ref 27–33)
MCHC RBC AUTO-ENTMCNC: 34.5 G/DL (ref 32–36)
MCV RBC AUTO: 88.6 FL (ref 80–100)
MONOCYTES # BLD AUTO: 578 CELLS/UL (ref 200–950)
MONOCYTES NFR BLD AUTO: 10.9 %
NEUTROPHILS # BLD AUTO: 2390 CELLS/UL (ref 1500–7800)
NEUTROPHILS NFR BLD AUTO: 45.1 %
NONHDLC SERPL-MCNC: 151 MG/DL (CALC)
PLATELET # BLD AUTO: 297 THOUSAND/UL (ref 140–400)
PMV BLD REES-ECKER: 9.8 FL (ref 7.5–12.5)
POTASSIUM SERPL-SCNC: 4.7 MMOL/L (ref 3.5–5.3)
PROT SERPL-MCNC: 7.4 G/DL (ref 6.1–8.1)
RBC # BLD AUTO: 5.46 MILLION/UL (ref 4.2–5.8)
SODIUM SERPL-SCNC: 141 MMOL/L (ref 135–146)
TRIGL SERPL-MCNC: 184 MG/DL
TSH SERPL-ACNC: 0.69 MIU/L (ref 0.4–4.5)
VIT B12 SERPL-MCNC: 375 PG/ML (ref 200–1100)
WBC # BLD AUTO: 5.3 THOUSAND/UL (ref 3.8–10.8)

## 2025-05-12 ENCOUNTER — APPOINTMENT (OUTPATIENT)
Dept: PRIMARY CARE | Facility: CLINIC | Age: 35
End: 2025-05-12
Payer: COMMERCIAL

## 2025-05-12 ENCOUNTER — PHARMACY VISIT (OUTPATIENT)
Dept: PHARMACY | Facility: CLINIC | Age: 35
End: 2025-05-12
Payer: COMMERCIAL

## 2025-05-12 VITALS
SYSTOLIC BLOOD PRESSURE: 128 MMHG | OXYGEN SATURATION: 97 % | BODY MASS INDEX: 32.1 KG/M2 | DIASTOLIC BLOOD PRESSURE: 80 MMHG | HEIGHT: 72 IN | HEART RATE: 75 BPM | WEIGHT: 237 LBS

## 2025-05-12 DIAGNOSIS — Z11.4 ENCOUNTER FOR SCREENING FOR HIV: ICD-10-CM

## 2025-05-12 DIAGNOSIS — Z00.00 ANNUAL PHYSICAL EXAM: ICD-10-CM

## 2025-05-12 DIAGNOSIS — R74.01 ELEVATED ALT MEASUREMENT: ICD-10-CM

## 2025-05-12 DIAGNOSIS — E78.2 MIXED HYPERLIPIDEMIA: ICD-10-CM

## 2025-05-12 DIAGNOSIS — Z11.59 NEED FOR HEPATITIS C SCREENING TEST: Primary | ICD-10-CM

## 2025-05-12 DIAGNOSIS — E55.9 VITAMIN D DEFICIENCY: ICD-10-CM

## 2025-05-12 DIAGNOSIS — S99.911S INJURY OF RIGHT ANKLE, SEQUELA: ICD-10-CM

## 2025-05-12 DIAGNOSIS — R93.0 ABNORMAL CT SCAN OF HEAD: ICD-10-CM

## 2025-05-12 PROCEDURE — 1036F TOBACCO NON-USER: CPT | Performed by: NURSE PRACTITIONER

## 2025-05-12 PROCEDURE — RXMED WILLOW AMBULATORY MEDICATION CHARGE

## 2025-05-12 PROCEDURE — 3008F BODY MASS INDEX DOCD: CPT | Performed by: NURSE PRACTITIONER

## 2025-05-12 PROCEDURE — 99214 OFFICE O/P EST MOD 30 MIN: CPT | Performed by: NURSE PRACTITIONER

## 2025-05-12 RX ORDER — CELECOXIB 100 MG/1
100 CAPSULE ORAL 2 TIMES DAILY
Qty: 180 CAPSULE | Refills: 0 | Status: SHIPPED | OUTPATIENT
Start: 2025-05-12 | End: 2025-08-10

## 2025-05-12 RX ORDER — CELECOXIB 100 MG/1
100 CAPSULE ORAL 2 TIMES DAILY
Qty: 60 CAPSULE | Refills: 0 | Status: SHIPPED | OUTPATIENT
Start: 2025-05-12 | End: 2025-05-12

## 2025-05-12 ASSESSMENT — PATIENT HEALTH QUESTIONNAIRE - PHQ9
SUM OF ALL RESPONSES TO PHQ9 QUESTIONS 1 AND 2: 0
1. LITTLE INTEREST OR PLEASURE IN DOING THINGS: NOT AT ALL
2. FEELING DOWN, DEPRESSED OR HOPELESS: NOT AT ALL

## 2025-05-12 ASSESSMENT — ENCOUNTER SYMPTOMS
DEPRESSION: 0
OCCASIONAL FEELINGS OF UNSTEADINESS: 0
LOSS OF SENSATION IN FEET: 0

## 2025-05-12 ASSESSMENT — ANXIETY QUESTIONNAIRES
2. NOT BEING ABLE TO STOP OR CONTROL WORRYING: NOT AT ALL
1. FEELING NERVOUS, ANXIOUS, OR ON EDGE: NOT AT ALL
5. BEING SO RESTLESS THAT IT IS HARD TO SIT STILL: NOT AT ALL
GAD7 TOTAL SCORE: 0
3. WORRYING TOO MUCH ABOUT DIFFERENT THINGS: NOT AT ALL
6. BECOMING EASILY ANNOYED OR IRRITABLE: NOT AT ALL
4. TROUBLE RELAXING: NOT AT ALL
7. FEELING AFRAID AS IF SOMETHING AWFUL MIGHT HAPPEN: NOT AT ALL

## 2025-05-12 NOTE — LETTER
May 12, 2025     Patient: Orlin Mckay   YOB: 1990   Date of Visit: 5/12/2025       To Whom It May Concern:    Orlin Mckay was seen in my clinic on 5/12/2025 at 1:40 pm. Please excuse Orlin for his absence from work on this day to make the appointment.    If you have any questions or concerns, please don't hesitate to call.         Sincerely,         Earle Dotson, APRN-CNP, DNP

## 2025-05-12 NOTE — PATIENT INSTRUCTIONS
Your lab results are unremarkable beside slightly low vitamin D level of 23, mildly elevated ALT of 61, elevated LDL cholesterol of 120 and mildly elevated triglyceride level of 184.  I recommend that you start taking over-the-counter vitamin D3 1000 unit daily and over-the-counter fish 1000mg daily for elevated triglyceride level.  I have ordered labs for you to complete a few days prior to 6-month follow-up for annual physical exam.

## 2025-05-13 NOTE — ASSESSMENT & PLAN NOTE
Weight loss, diet and exercise discussed.  Oatmeal, flaxseeds,fish oil and red yeast rice recommended.

## 2025-05-13 NOTE — ASSESSMENT & PLAN NOTE
I recommend starting over-the-counter vitamin D3 1000 unit daily.  Recheck serum vitamin D level with next blood draw.
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
## 2025-11-12 ENCOUNTER — APPOINTMENT (OUTPATIENT)
Dept: PRIMARY CARE | Facility: CLINIC | Age: 35
End: 2025-11-12
Payer: COMMERCIAL

## (undated) DEVICE — TUBING, IRRIGATION, HIGH FLOW, HAND PIECE SET

## (undated) DEVICE — CUFF, TOURNIQUET, DUAL PORT, SNG BLADDER, 30 IN, PLC

## (undated) DEVICE — BLADE, GEN COATED 2.75, LF

## (undated) DEVICE — SUTURE, VICRYL, 2-0, 27 IN, BR/SH 27, VIOLET

## (undated) DEVICE — DRESSING, GAUZE, PETROLATUM, STRIP, XEROFORM, 1 X 8 IN, STERILE

## (undated) DEVICE — CAUTERY, PENCIL, PUSH BUTTON, SMOKE EVAC, 70MM

## (undated) DEVICE — DRESSING, ABDOMINAL PAD, CURITY, 7.5 X 8 IN

## (undated) DEVICE — STRAP, VELCRO, BODY, 4 X 60IN, NS

## (undated) DEVICE — DRAPE, SHEET, U, W/ADHESIVE STRIP, IMPERVIOUS, 60 X 70 IN, DISPOSABLE, LF, STERILE

## (undated) DEVICE — PADDING, CAST, SPECIALIST, 6 IN X 4 YD, STERILE

## (undated) DEVICE — SUTURE, ETHILON, 3-0, 18 IN, PS1, BLACK

## (undated) DEVICE — DRAPE, SHEET, U, STERI DRAPE, 47 X 51 IN, DISPOSABLE, STERILE

## (undated) DEVICE — DRAPE, SHEET, EXTREMITY, W/ARM BOARD COVERS, 87 X 106 X 128 IN, DISPOSABLE, LF, STERILE

## (undated) DEVICE — BANDAGE, COFLEX, 6 X 5 YDS, FOAM TAN, STERILE, LF

## (undated) DEVICE — Device

## (undated) DEVICE — GOWN, SURGICAL, ROYAL SILK, XXL, STERILE

## (undated) DEVICE — STRAP, ARM BOARD, 32 X 1.5

## (undated) DEVICE — STOCKINETTE, IMPERVIOUS, LARGE, 9IN X 48IN

## (undated) DEVICE — MANIFOLD, 4 PORT NEPTUNE STANDARD

## (undated) DEVICE — PREP, IODOPHOR, W/ALCOHOL, DURAPREP, W/APPLICATOR, 26 CC

## (undated) DEVICE — DRESSING, GAUZE, SPONGE, 12 PLY, 4 X 4 IN, PLASTIC POUCH, STRL 10PK